# Patient Record
Sex: MALE | Race: WHITE | NOT HISPANIC OR LATINO | Employment: FULL TIME | URBAN - METROPOLITAN AREA
[De-identification: names, ages, dates, MRNs, and addresses within clinical notes are randomized per-mention and may not be internally consistent; named-entity substitution may affect disease eponyms.]

---

## 2017-04-21 ENCOUNTER — OFFICE VISIT (OUTPATIENT)
Dept: INTERNAL MEDICINE | Facility: CLINIC | Age: 32
End: 2017-04-21

## 2017-04-21 VITALS
TEMPERATURE: 97.4 F | BODY MASS INDEX: 37.79 KG/M2 | WEIGHT: 240.8 LBS | SYSTOLIC BLOOD PRESSURE: 122 MMHG | DIASTOLIC BLOOD PRESSURE: 76 MMHG | HEIGHT: 67 IN

## 2017-04-21 DIAGNOSIS — F41.8 DEPRESSION WITH ANXIETY: Primary | ICD-10-CM

## 2017-04-21 PROCEDURE — 99204 OFFICE O/P NEW MOD 45 MIN: CPT | Performed by: FAMILY MEDICINE

## 2017-04-21 RX ORDER — DULOXETIN HYDROCHLORIDE 30 MG/1
CAPSULE, DELAYED RELEASE ORAL
Qty: 60 CAPSULE | Refills: 0 | Status: SHIPPED | OUTPATIENT
Start: 2017-04-21 | End: 2017-05-19

## 2017-04-21 NOTE — PATIENT INSTRUCTIONS
1. PSYCH- depression with anxiety- education today re serotonin and norepinephrine imbalances in the limbic system. Will do trial with cymbalta with helps both of these chemicals. Will start at 30mg for a week and then increase to 60mg.  2. RECHECK- 1mo recheck mood

## 2017-04-21 NOTE — PROGRESS NOTES
"Subjective   Lexa Conn is a 32 y.o. male.     History of Present Illness   New patient, here to establish.     PSYCH- mood and fatigue issues. Today pt reports he started a new job 3yr ago. Then 1yr ago he started having issues. Describes mood as anxious and sad. Has associated symptoms anhedonia, overwhelmed, fatigue, w/d, decreased motivation, crying, memory and concentration problems, feeling helpless and hopeless. No SI.     The following portions of the patient's history were reviewed and updated as appropriate: current medications, past family history, past medical history, past social history, past surgical history and problem list.    Review of Systems   Constitutional: Positive for fatigue.   HENT: Positive for hearing loss.    Cardiovascular: Negative for chest pain.   Gastrointestinal: Negative for abdominal distention and abdominal pain.   Musculoskeletal: Positive for arthralgias.   Skin: Negative for color change.   Neurological: Negative for tremors, speech difficulty and headaches.   Psychiatric/Behavioral: Negative for agitation and confusion. The patient is nervous/anxious.         Depression   All other systems reviewed and are negative.        Current Outpatient Prescriptions:   •  DULoxetine (CYMBALTA) 30 MG capsule, 1 tab po qd x1week then 2 tabs po qd, Disp: 60 capsule, Rfl: 0    Objective     /76  Temp 97.4 °F (36.3 °C)  Ht 67\" (170.2 cm)  Wt 240 lb 12.8 oz (109 kg)  BMI 37.71 kg/m2    Physical Exam   Constitutional: He is oriented to person, place, and time. He appears well-developed and well-nourished.   HENT:   Right Ear: Tympanic membrane and ear canal normal.   Left Ear: Tympanic membrane and ear canal normal.   Mouth/Throat: Oropharynx is clear and moist.   Eyes: Conjunctivae and EOM are normal. Pupils are equal, round, and reactive to light.   Neck: No thyromegaly present.   Cardiovascular: Normal rate and regular rhythm.    Pulmonary/Chest: Effort normal and breath " sounds normal.   Neurological: He is alert and oriented to person, place, and time.   Skin: Skin is warm and dry.   Psychiatric: His behavior is normal. Judgment and thought content normal.   tearful   Vitals reviewed.      Assessment/Plan   Lexa was seen today for establish care.    Diagnoses and all orders for this visit:    Depression with anxiety  -     DULoxetine (CYMBALTA) 30 MG capsule; 1 tab po qd x1week then 2 tabs po qd      1. PSYCH- depression with anxiety- education today re serotonin and norepinephrine imbalances in the limbic system. Will do trial with cymbalta with helps both of these chemicals. Will start at 30mg for a week and then increase to 60mg.  2. RECHECK- 1mo recheck mood

## 2017-05-19 ENCOUNTER — OFFICE VISIT (OUTPATIENT)
Dept: INTERNAL MEDICINE | Facility: CLINIC | Age: 32
End: 2017-05-19

## 2017-05-19 VITALS
SYSTOLIC BLOOD PRESSURE: 122 MMHG | DIASTOLIC BLOOD PRESSURE: 86 MMHG | TEMPERATURE: 97.3 F | WEIGHT: 238.2 LBS | BODY MASS INDEX: 37.39 KG/M2 | HEIGHT: 67 IN

## 2017-05-19 DIAGNOSIS — F41.8 DEPRESSION WITH ANXIETY: Primary | ICD-10-CM

## 2017-05-19 PROCEDURE — 99213 OFFICE O/P EST LOW 20 MIN: CPT | Performed by: FAMILY MEDICINE

## 2017-05-19 RX ORDER — VENLAFAXINE HYDROCHLORIDE 75 MG/1
CAPSULE, EXTENDED RELEASE ORAL
Qty: 30 CAPSULE | Refills: 1 | Status: SHIPPED | OUTPATIENT
Start: 2017-05-19 | End: 2017-07-03 | Stop reason: SDUPTHER

## 2017-05-19 RX ORDER — VENLAFAXINE HYDROCHLORIDE 75 MG/1
CAPSULE, EXTENDED RELEASE ORAL
Qty: 30 CAPSULE | Refills: 0 | Status: SHIPPED | OUTPATIENT
Start: 2017-05-19 | End: 2017-05-19 | Stop reason: SDUPTHER

## 2017-07-03 ENCOUNTER — OFFICE VISIT (OUTPATIENT)
Dept: INTERNAL MEDICINE | Facility: CLINIC | Age: 32
End: 2017-07-03

## 2017-07-03 VITALS
TEMPERATURE: 97.4 F | SYSTOLIC BLOOD PRESSURE: 118 MMHG | WEIGHT: 240 LBS | DIASTOLIC BLOOD PRESSURE: 84 MMHG | BODY MASS INDEX: 37.67 KG/M2 | HEIGHT: 67 IN

## 2017-07-03 DIAGNOSIS — F41.8 DEPRESSION WITH ANXIETY: ICD-10-CM

## 2017-07-03 DIAGNOSIS — Z00.00 ANNUAL PHYSICAL EXAM: Primary | ICD-10-CM

## 2017-07-03 LAB
25(OH)D3 SERPL-MCNC: 9.7 NG/ML
ALBUMIN SERPL-MCNC: 5 G/DL (ref 3.2–4.8)
ALBUMIN/GLOB SERPL: 2.1 G/DL (ref 1.5–2.5)
ALP SERPL-CCNC: 88 U/L (ref 25–100)
ALT SERPL W P-5'-P-CCNC: 37 U/L (ref 7–40)
ANION GAP SERPL CALCULATED.3IONS-SCNC: 12 MMOL/L (ref 3–11)
AST SERPL-CCNC: 26 U/L (ref 0–33)
BASOPHILS # BLD AUTO: 0.03 10*3/MM3 (ref 0–0.2)
BASOPHILS NFR BLD AUTO: 0.3 % (ref 0–1)
BILIRUB SERPL-MCNC: 0.6 MG/DL (ref 0.3–1.2)
BUN BLD-MCNC: 16 MG/DL (ref 9–23)
BUN/CREAT SERPL: 17.8 (ref 7–25)
CALCIUM SPEC-SCNC: 11 MG/DL (ref 8.7–10.4)
CHLORIDE SERPL-SCNC: 101 MMOL/L (ref 99–109)
CO2 SERPL-SCNC: 26 MMOL/L (ref 20–31)
CREAT BLD-MCNC: 0.9 MG/DL (ref 0.6–1.3)
DEPRECATED RDW RBC AUTO: 46.2 FL (ref 37–54)
EOSINOPHIL # BLD AUTO: 0.18 10*3/MM3 (ref 0–0.3)
EOSINOPHIL NFR BLD AUTO: 1.7 % (ref 0–3)
ERYTHROCYTE [DISTWIDTH] IN BLOOD BY AUTOMATED COUNT: 13.8 % (ref 11.3–14.5)
GFR SERPL CREATININE-BSD FRML MDRD: 98 ML/MIN/1.73
GLOBULIN UR ELPH-MCNC: 2.4 GM/DL
GLUCOSE BLD-MCNC: 67 MG/DL (ref 70–100)
HCT VFR BLD AUTO: 50.3 % (ref 38.9–50.9)
HGB BLD-MCNC: 17.1 G/DL (ref 13.1–17.5)
IMM GRANULOCYTES # BLD: 0.11 10*3/MM3 (ref 0–0.03)
IMM GRANULOCYTES NFR BLD: 1.1 % (ref 0–0.6)
LYMPHOCYTES # BLD AUTO: 2.81 10*3/MM3 (ref 0.6–4.8)
LYMPHOCYTES NFR BLD AUTO: 27.1 % (ref 24–44)
MCH RBC QN AUTO: 31.3 PG (ref 27–31)
MCHC RBC AUTO-ENTMCNC: 34 G/DL (ref 32–36)
MCV RBC AUTO: 92.1 FL (ref 80–99)
MONOCYTES # BLD AUTO: 0.85 10*3/MM3 (ref 0–1)
MONOCYTES NFR BLD AUTO: 8.2 % (ref 0–12)
NEUTROPHILS # BLD AUTO: 6.38 10*3/MM3 (ref 1.5–8.3)
NEUTROPHILS NFR BLD AUTO: 61.6 % (ref 41–71)
PLATELET # BLD AUTO: 278 10*3/MM3 (ref 150–450)
PMV BLD AUTO: 10.1 FL (ref 6–12)
POTASSIUM BLD-SCNC: 4.3 MMOL/L (ref 3.5–5.5)
PROT SERPL-MCNC: 7.4 G/DL (ref 5.7–8.2)
RBC # BLD AUTO: 5.46 10*6/MM3 (ref 4.2–5.76)
SODIUM BLD-SCNC: 139 MMOL/L (ref 132–146)
TSH SERPL DL<=0.05 MIU/L-ACNC: 0.98 MIU/ML (ref 0.35–5.35)
VIT B12 BLD-MCNC: 542 PG/ML (ref 211–911)
WBC NRBC COR # BLD: 10.36 10*3/MM3 (ref 3.5–10.8)

## 2017-07-03 PROCEDURE — 84443 ASSAY THYROID STIM HORMONE: CPT | Performed by: FAMILY MEDICINE

## 2017-07-03 PROCEDURE — 90715 TDAP VACCINE 7 YRS/> IM: CPT | Performed by: FAMILY MEDICINE

## 2017-07-03 PROCEDURE — 99395 PREV VISIT EST AGE 18-39: CPT | Performed by: FAMILY MEDICINE

## 2017-07-03 PROCEDURE — 82607 VITAMIN B-12: CPT | Performed by: FAMILY MEDICINE

## 2017-07-03 PROCEDURE — 80053 COMPREHEN METABOLIC PANEL: CPT | Performed by: FAMILY MEDICINE

## 2017-07-03 PROCEDURE — 90471 IMMUNIZATION ADMIN: CPT | Performed by: FAMILY MEDICINE

## 2017-07-03 PROCEDURE — 82306 VITAMIN D 25 HYDROXY: CPT | Performed by: FAMILY MEDICINE

## 2017-07-03 PROCEDURE — 85025 COMPLETE CBC W/AUTO DIFF WBC: CPT | Performed by: FAMILY MEDICINE

## 2017-07-03 RX ORDER — VENLAFAXINE HYDROCHLORIDE 75 MG/1
CAPSULE, EXTENDED RELEASE ORAL
Qty: 30 CAPSULE | Refills: 0 | Status: SHIPPED | OUTPATIENT
Start: 2017-07-03 | End: 2017-08-24 | Stop reason: SDUPTHER

## 2017-07-03 NOTE — PATIENT INSTRUCTIONS
1. CPE- will do labs today, especially as he is still tired. Will do Tdap today. Cerumen. Will irrigate both ears today.  2. PSYCH- depression with anxiety- mood where it should be for 2mo of treatment. Will continue the effexor and recheck in 1mo to ensure he reaches goal.   3. RECHECK- 1mo

## 2017-07-03 NOTE — PROGRESS NOTES
Subjective   Lexa Conn is a 32 y.o. male.   History of Present Illness   Here for 1mo recheck mood and CPE. Last seen 5/19/17. Pt was seen 4/21/17 as a new patient.      1.PSYCH- depression with anxiety, diagnosed at initial visit 4/21/17. At that time pt reported 1yr of symptoms of sad and anxious mood with anhedonia, feeling overwhelmed, fatigue, w/d, decreased motivation, crying, memory and concentration problems, feeling helpless and hopeless. No SI. Pt was started on Cymbalta and felt a little better but only reached 30% at 1mo and was still very tired. Was changed to Effexor 75. Today he reports no issues with the transition and no S/E with effexor. Is feeling better. Overall is 60% better. Is still tired.    2. 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  CV- risk factors: not a smoker. No fam hx premature CAD  GI- no rectal bleeding. No fam hx colon ca. Previous colonoscopy: none  - no prostate or bladder c/o. No ED. No fam hx prostate ca.  Last labs: nothing recent.  Immunizations: Last TDaP: due. Previous pneumovax: none. Previous zostavax: none.  New concerns: right ear pain. Has had issues with ear wax in past, especially on right. Has been hurting and he is concerned re this.     The following portions of the patient's history were reviewed and updated as appropriate: current medications, past family history, past medical history, past social history, past surgical history and problem list.    Review of Systems   HENT: Positive for ear pain (right ear).    Cardiovascular: Negative for chest pain.   Gastrointestinal: Negative for abdominal distention and abdominal pain.   Skin: Negative for color change.   Neurological: Negative for tremors, speech difficulty and headaches.   Psychiatric/Behavioral: Negative for agitation and confusion.   All other systems reviewed and are negative.        Current Outpatient Prescriptions:   •  venlafaxine XR (EFFEXOR-XR) 75 MG 24 hr capsule, 1 tab po qd, Disp: 30 capsule,  "Rfl: 0      Objective     /84  Temp 97.4 °F (36.3 °C)  Ht 67\" (170.2 cm)  Wt 240 lb (109 kg)  BMI 37.59 kg/m2    Physical Exam   Constitutional: He is oriented to person, place, and time. He appears well-developed and well-nourished.   HENT:   Right Ear: Tympanic membrane and ear canal normal.   Left Ear: Tympanic membrane and ear canal normal.   Mouth/Throat: Oropharynx is clear and moist.   TM blocked bilat   Eyes: Conjunctivae and EOM are normal. Pupils are equal, round, and reactive to light.   Neck: No thyromegaly present.   Cardiovascular: Normal rate and regular rhythm.    Pulmonary/Chest: Effort normal and breath sounds normal.   Neurological: He is alert and oriented to person, place, and time.   Skin: Skin is warm and dry.   Psychiatric: He has a normal mood and affect.   Vitals reviewed.      Reviewed the following with the patient: Discussion today with patient regarding current guidelines for timing/ frequency of colonoscopy and lab tests (including PSA).     Immunizations discussed today with current recommendations advised for DTaP, Zostavax, Pneumovax and Prevnar 13.    Appropriate diet and stretching discussed with handouts provided.        Assessment/Plan   eLxa was seen today for annual exam.    Diagnoses and all orders for this visit:    Annual physical exam  -     CBC & Differential  -     Comprehensive Metabolic Panel  -     Vitamin B12  -     Vitamin D 25 Hydroxy  -     TSH  -     Tdap Vaccine Greater Than or Equal To 8yo IM  -     CBC Auto Differential    Depression with anxiety  -     venlafaxine XR (EFFEXOR-XR) 75 MG 24 hr capsule; 1 tab po qd        1. CPE- will do labs today, especially as he is still tired. Will do Tdap today. Cerumen. Will irrigate both ears today.  2. PSYCH- depression with anxiety- mood where it should be for 2mo of treatment. Will continue the effexor and recheck in 1mo to ensure he reaches goal.   3. RECHECK- 1mo       "

## 2017-07-07 ENCOUNTER — TELEPHONE (OUTPATIENT)
Dept: INTERNAL MEDICINE | Facility: CLINIC | Age: 32
End: 2017-07-07

## 2017-07-07 ENCOUNTER — OFFICE VISIT (OUTPATIENT)
Dept: INTERNAL MEDICINE | Facility: CLINIC | Age: 32
End: 2017-07-07

## 2017-07-07 VITALS — WEIGHT: 240 LBS | TEMPERATURE: 97.8 F | BODY MASS INDEX: 37.67 KG/M2 | HEIGHT: 67 IN

## 2017-07-07 DIAGNOSIS — J30.9 ALLERGIC RHINITIS, UNSPECIFIED ALLERGIC RHINITIS TRIGGER, UNSPECIFIED RHINITIS SEASONALITY: Primary | ICD-10-CM

## 2017-07-07 PROCEDURE — 99213 OFFICE O/P EST LOW 20 MIN: CPT | Performed by: FAMILY MEDICINE

## 2017-07-07 RX ORDER — FLUTICASONE PROPIONATE 50 MCG
2 SPRAY, SUSPENSION (ML) NASAL DAILY
Qty: 1 EACH | Refills: 0 | Status: SHIPPED | OUTPATIENT
Start: 2017-07-07 | End: 2017-08-06

## 2017-07-07 RX ORDER — HYDROCODONE BITARTRATE AND ACETAMINOPHEN 5; 325 MG/1; MG/1
1 TABLET ORAL EVERY 6 HOURS PRN
Qty: 8 TABLET | Refills: 0 | Status: SHIPPED | OUTPATIENT
Start: 2017-07-07 | End: 2017-08-24

## 2017-07-07 NOTE — PROGRESS NOTES
"Subjective   Lexa Conn is a 32 y.o. male.     History of Present Illness   Here today as a work in for right ear pain. Was seen yesterday for CPE and had cerumen impaction, irrigated to clear with good success.    ENT- today pt reports he had minor vertigo with the irrigation. No pain with the procedure but it did not relieve the pain that he already had and it is still present. Pain kept him awake last night.    The following portions of the patient's history were reviewed and updated as appropriate: current medications, past family history, past medical history, past social history, past surgical history and problem list.    Review of Systems   HENT: Positive for ear pain (on right).    Cardiovascular: Negative for chest pain.   Gastrointestinal: Negative for abdominal distention and abdominal pain.   Skin: Negative for color change.   Neurological: Negative for tremors, speech difficulty and headaches.   Psychiatric/Behavioral: Negative for agitation and confusion.   All other systems reviewed and are negative.        Current Outpatient Prescriptions:   •  fluticasone (FLONASE) 50 MCG/ACT nasal spray, 2 sprays into each nostril Daily for 30 days., Disp: 1 each, Rfl: 0  •  HYDROcodone-acetaminophen (NORCO) 5-325 MG per tablet, Take 1 tablet by mouth Every 6 (Six) Hours As Needed for Severe Pain (7-10)., Disp: 8 tablet, Rfl: 0  •  venlafaxine XR (EFFEXOR-XR) 75 MG 24 hr capsule, 1 tab po qd, Disp: 30 capsule, Rfl: 0    Objective     Temp 97.8 °F (36.6 °C)  Ht 67\" (170.2 cm)  Wt 240 lb (109 kg)  BMI 37.59 kg/m2    Physical Exam   Constitutional: He is oriented to person, place, and time. He appears well-developed and well-nourished.   HENT:   Right Ear: Tympanic membrane and ear canal normal.   Left Ear: Tympanic membrane and ear canal normal.   Mouth/Throat: Oropharynx is clear and moist.   TM loss of normal light reflex bilat. Canal normal. No TMJ dysfunction. Throat clear.   Eyes: Conjunctivae and EOM are " normal. Pupils are equal, round, and reactive to light.   Neck: No thyromegaly present.   Cardiovascular: Normal rate and regular rhythm.    Pulmonary/Chest: Effort normal and breath sounds normal.   Neurological: He is alert and oriented to person, place, and time.   Skin: Skin is warm and dry.   Psychiatric: He has a normal mood and affect.   Vitals reviewed.      Assessment/Plan   Lexa was seen today for earache.    Diagnoses and all orders for this visit:    Allergic rhinitis, unspecified allergic rhinitis trigger, unspecified rhinitis seasonality  -     fluticasone (FLONASE) 50 MCG/ACT nasal spray; 2 sprays into each nostril Daily for 30 days.  -     HYDROcodone-acetaminophen (NORCO) 5-325 MG per tablet; Take 1 tablet by mouth Every 6 (Six) Hours As Needed for Severe Pain (7-10).      1. RESP- allergies with eustachian tube dysfunction. Edu re the pathophysiology provided. Will treat with flonase. Continue the claritin.   2. RECHECK- prn

## 2017-07-07 NOTE — TELEPHONE ENCOUNTER
PT SAID HE CAME IN THIS PAST Monday AND HIS RIGHT EAR STILL HURTS AND WANTS TO NO IF HE COULD COME BACK IN THIS AFTER NOON TO GET IT LOOKED AT. PT WANTS YOU TO CALL HIM

## 2017-07-07 NOTE — PATIENT INSTRUCTIONS
1. RESP- allergies with eustachian tube dysfunction. Edu re the pathophysiology provided. Will treat with flonase. Continue the claritin.   2. RECHECK- prn

## 2017-08-24 ENCOUNTER — OFFICE VISIT (OUTPATIENT)
Dept: INTERNAL MEDICINE | Facility: CLINIC | Age: 32
End: 2017-08-24

## 2017-08-24 VITALS
DIASTOLIC BLOOD PRESSURE: 104 MMHG | BODY MASS INDEX: 38.04 KG/M2 | HEIGHT: 67 IN | TEMPERATURE: 97.5 F | SYSTOLIC BLOOD PRESSURE: 144 MMHG | WEIGHT: 242.4 LBS

## 2017-08-24 DIAGNOSIS — F41.8 DEPRESSION WITH ANXIETY: Primary | ICD-10-CM

## 2017-08-24 DIAGNOSIS — E83.52 HYPERCALCEMIA: ICD-10-CM

## 2017-08-24 DIAGNOSIS — E55.9 VITAMIN D DEFICIENCY: ICD-10-CM

## 2017-08-24 DIAGNOSIS — IMO0001 ELEVATED BLOOD PRESSURE: ICD-10-CM

## 2017-08-24 DIAGNOSIS — E16.2 HYPOGLYCEMIA: ICD-10-CM

## 2017-08-24 PROCEDURE — 99214 OFFICE O/P EST MOD 30 MIN: CPT | Performed by: FAMILY MEDICINE

## 2017-08-24 RX ORDER — VENLAFAXINE HYDROCHLORIDE 75 MG/1
CAPSULE, EXTENDED RELEASE ORAL
Qty: 30 CAPSULE | Refills: 5 | Status: SHIPPED | OUTPATIENT
Start: 2017-08-24 | End: 2018-04-27 | Stop reason: SDUPTHER

## 2017-08-24 NOTE — PATIENT INSTRUCTIONS
1. PSYCH- depression with anxiety- mood at goal RF effexor x6mo now.  2. CV- BP elevated today. Discussed that this could relate to the effexor, could be an isolated incident or he could be developing HTN. He is to do every morning BP checks and bring log to visit in 1mo.  3. ABN LABS- low sugar, high calcium, low vit D- discussed that the low sugar can indicate he needs more protein in his diet. Can also be related to labile sugars. Will recheck his glu and A1C in 1mo. Discussed that the high calcium and low vit D can be related. He is to start vitamin D 5000 IU every day now. Will recheck his calcium, vit D and a PTH in 1mo.  4. RECHECK- 1mo

## 2017-08-24 NOTE — PROGRESS NOTES
Subjective   Lexa Conn is a 32 y.o. male.     History of Present Illness   Here for 1mo recheck mood. Last seen 7/7/17 for allergies. Was seen 7/3/17 for recheck and CPE. Pt was seen 4/21/17 as a new patient. Lab 7/3/17 demo normal CBC, TSH and B12. Had CMP with glu 67, calcium 11. Had vit D 9.7.     1.PSYCH- depression with anxiety, diagnosed at initial visit 4/21/17. At that time pt reported 1yr of symptoms of sad and anxious mood with anhedonia, feeling overwhelmed, fatigue, w/d, decreased motivation, crying, memory and concentration problems, feeling helpless and hopeless. No SI. Pt was started on Cymbalta and felt a little better but only reached 30% at 1mo and was still very tired. Was changed to Effexor 75 at reached 60% of goal at 2mo; was continued on same. Today he reports he feels at goal on the effexor.     2. RESP- allergies- given Flonase.  3. ABN LABS- glu 67, calcium 11, vit D 9.7.  Today pt reports he was fasting that day.  He is still tired but not bad and feels it relates to work.  He does get protein in his diet. He does not take TUMS or calcium. He has not started the vit D yet. Has been taking a multivite with D.     The following portions of the patient's history were reviewed and updated as appropriate: current medications, past family history, past medical history, past social history, past surgical history and problem list.    Review of Systems   Cardiovascular: Negative for chest pain.   Gastrointestinal: Negative for abdominal distention and abdominal pain.   Skin: Negative for color change.   Neurological: Negative for tremors, speech difficulty and headaches.   Psychiatric/Behavioral: Negative for agitation and confusion.   All other systems reviewed and are negative.        Current Outpatient Prescriptions:   •  venlafaxine XR (EFFEXOR-XR) 75 MG 24 hr capsule, 1 tab po qd, Disp: 30 capsule, Rfl: 5    Objective     BP (!) 144/104 (BP Location: Left arm, Patient Position: Sitting)   "Temp 97.5 °F (36.4 °C)  Ht 67\" (170.2 cm)  Wt 242 lb 6.4 oz (110 kg)  BMI 37.97 kg/m2    Physical Exam   Constitutional: He is oriented to person, place, and time. He appears well-developed and well-nourished.   HENT:   Right Ear: Tympanic membrane and ear canal normal.   Left Ear: Tympanic membrane and ear canal normal.   Mouth/Throat: Oropharynx is clear and moist.   Eyes: Conjunctivae and EOM are normal. Pupils are equal, round, and reactive to light.   Neck: No thyromegaly present.   Cardiovascular: Normal rate and regular rhythm.    Pulmonary/Chest: Effort normal and breath sounds normal.   Neurological: He is alert and oriented to person, place, and time.   Skin: Skin is warm and dry.   Psychiatric: He has a normal mood and affect.   Vitals reviewed.      Assessment/Plan   Lexa was seen today for follow-up.    Diagnoses and all orders for this visit:    Depression with anxiety  -     venlafaxine XR (EFFEXOR-XR) 75 MG 24 hr capsule; 1 tab po qd    Hypoglycemia    Hypercalcemia    Vitamin D deficiency    Elevated blood pressure      1. PSYCH- depression with anxiety- mood at goal RF effexor x6mo now.  2. CV- BP elevated today. Discussed that this could relate to the effexor, could be an isolated incident or he could be developing HTN. He is to do every morning BP checks and bring log to visit in 1mo.  3. ABN LABS- low sugar, high calcium, low vit D- discussed that the low sugar can indicate he needs more protein in his diet. Can also be related to labile sugars. Will recheck his glu and A1C in 1mo. Discussed that the high calcium and low vit D can be related. He is to start vitamin D 5000 IU every day now. Will recheck his calcium, vit D and a PTH in 1mo.  4. RECHECK- 1mo       "

## 2017-09-22 ENCOUNTER — OFFICE VISIT (OUTPATIENT)
Dept: INTERNAL MEDICINE | Facility: CLINIC | Age: 32
End: 2017-09-22

## 2017-09-22 VITALS
BODY MASS INDEX: 38.39 KG/M2 | WEIGHT: 244.6 LBS | SYSTOLIC BLOOD PRESSURE: 140 MMHG | HEIGHT: 67 IN | TEMPERATURE: 98.1 F | DIASTOLIC BLOOD PRESSURE: 90 MMHG

## 2017-09-22 DIAGNOSIS — R89.9 ABNORMAL LABORATORY TEST: Primary | ICD-10-CM

## 2017-09-22 DIAGNOSIS — R89.9 ABNORMAL LABORATORY TEST RESULT: Primary | ICD-10-CM

## 2017-09-22 DIAGNOSIS — I10 ESSENTIAL HYPERTENSION: ICD-10-CM

## 2017-09-22 LAB
25(OH)D3 SERPL-MCNC: 26.8 NG/ML
CALCIUM SPEC-SCNC: 9.8 MG/DL (ref 8.7–10.4)

## 2017-09-22 PROCEDURE — 82310 ASSAY OF CALCIUM: CPT | Performed by: FAMILY MEDICINE

## 2017-09-22 PROCEDURE — 83970 ASSAY OF PARATHORMONE: CPT | Performed by: FAMILY MEDICINE

## 2017-09-22 PROCEDURE — 82306 VITAMIN D 25 HYDROXY: CPT | Performed by: FAMILY MEDICINE

## 2017-09-22 PROCEDURE — 99214 OFFICE O/P EST MOD 30 MIN: CPT | Performed by: FAMILY MEDICINE

## 2017-09-22 RX ORDER — TOPIRAMATE 25 MG/1
TABLET ORAL
Qty: 60 TABLET | Refills: 0 | Status: SHIPPED | OUTPATIENT
Start: 2017-09-22 | End: 2017-10-26

## 2017-09-22 NOTE — PROGRESS NOTES
"Subjective   Lexa Conn is a 32 y.o. male.     History of Present Illness   Here for 1mo recheck BP. Last seen 8/24/17 for recheck mood. Was seen 7/3/17 for CPE. Pt was seen 4/21/17 as a new patient. Lab 7/3/17 demo normal CBC, TSH and B12. Had vit D 9.7; advised 5k. Had CMP with glu 67, calcium 11 with recheck pending.     1.PSYCH- depression with anxiety, diagnosed at initial visit 4/21/17. At that time pt reported 1yr of symptoms of sad and anxious mood with anhedonia, feeling overwhelmed, fatigue, w/d, decreased motivation, crying, memory and concentration problems, feeling helpless and hopeless. No SI. Pt was started on Cymbalta and felt a little better but only reached 30% at 1mo and was still very tired. Was changed to Effexor 75 and reached goal by visit 8/24/17.  2. RESP- allergies- given Flonase.  3. ABN LABS- glu 67, calcium 11, vit D 9.7.  No symptoms.    4. CV-  Elevated BP at last visit. Pt asked to do home checks. Brings log today that demo 131-143 over 88-97. He does have a fam hx HTN. Has gained 40 lb at his current job.  The following portions of the patient's history were reviewed and updated as appropriate: current medications, past family history, past medical history, past social history, past surgical history and problem list.    Review of Systems   Cardiovascular: Negative for chest pain.   Gastrointestinal: Negative for abdominal distention and abdominal pain.   Skin: Negative for color change.   Neurological: Negative for tremors, speech difficulty and headaches.   Psychiatric/Behavioral: Negative for agitation and confusion.   All other systems reviewed and are negative.        Current Outpatient Prescriptions:   •  topiramate (TOPAMAX) 25 MG tablet, 1 tab po qd x1wk then 2 tabs po qd, Disp: 60 tablet, Rfl: 0  •  venlafaxine XR (EFFEXOR-XR) 75 MG 24 hr capsule, 1 tab po qd, Disp: 30 capsule, Rfl: 5    Objective     /90  Temp 98.1 °F (36.7 °C)  Ht 67\" (170.2 cm)  Wt 244 lb 9.6 " oz (111 kg)  BMI 38.31 kg/m2    Physical Exam   Constitutional: He is oriented to person, place, and time. He appears well-developed and well-nourished.   HENT:   Right Ear: Tympanic membrane and ear canal normal.   Left Ear: Tympanic membrane and ear canal normal.   Mouth/Throat: Oropharynx is clear and moist.   Eyes: Conjunctivae and EOM are normal. Pupils are equal, round, and reactive to light.   Neck: No thyromegaly present.   Cardiovascular: Normal rate and regular rhythm.    Pulmonary/Chest: Effort normal and breath sounds normal.   Neurological: He is alert and oriented to person, place, and time.   Skin: Skin is warm and dry.   Psychiatric: He has a normal mood and affect.   Vitals reviewed.      Assessment/Plan   Lexa was seen today for follow-up.    Diagnoses and all orders for this visit:    Abnormal laboratory test    Essential hypertension  -     topiramate (TOPAMAX) 25 MG tablet; 1 tab po qd x1wk then 2 tabs po qd      1. ABN LAB- recheck sugar 66 with A1C 5.0. Discussed with pt that he needs more protein in his diet. He has a protein shake in am and is advised to have another. CMP drawn today.  2. CV- HTN- discussed that he has mild hypertension. Discussed weight loss vs BP meds. Will do trial with low dose topamax first. If he has side effects that he cannot tolerate, he is to call and we will change him to lisinopril HCT 10/12.5.  3. RECHECK- 1mo

## 2017-09-22 NOTE — PATIENT INSTRUCTIONS
1. ABN LAB- recheck sugar 66 with A1C 5.0. Discussed with pt that he needs more protein in his diet. He has a protein shake in am and is advised to have another. CMP drawn today.  2. CV- HTN- discussed that he has mild hypertension. Discussed weight loss vs BP meds. Will do trial with low dose topamax first. If he has side effects that he cannot tolerate, he is to call and we will change him to lisinopril HCT 10/12.5.  3. RECHECK- 1mo

## 2017-09-25 LAB — PTH-INTACT SERPL-MCNC: 25 PG/ML (ref 15–65)

## 2017-10-04 ENCOUNTER — TELEPHONE (OUTPATIENT)
Dept: INTERNAL MEDICINE | Facility: CLINIC | Age: 32
End: 2017-10-04

## 2017-10-04 NOTE — TELEPHONE ENCOUNTER
Patient is calling requesting a phone call about his medication. Patient feels like the meds are not working. Please call patient.

## 2017-10-05 NOTE — TELEPHONE ENCOUNTER
I spoke with pt who says that he doesn't think the medication is working for his weight loss. I explained that the topamax is generally what we start with but it's the Trokendi that seems to produce the best results. I told him that we have samples that I will get ready for him but pt states that he is in [place] but will have his girlfriend come by tomorrow to pick them up. I told pt I will consult with Dr. Mitchell tomorrow to be certain of the dose and let him know when this is ready. Pt expressed understanding.

## 2017-10-06 NOTE — TELEPHONE ENCOUNTER
Per Dr. Mitchell, the patient just needs to increase his dose to 100 vs 50. To avoid an additional co-pay, pt will come to the office and  samples of Trokendi 100 mg. Pt informed and will have his girlfriend come over and pick these up as they are ready in the front office.

## 2017-10-13 ENCOUNTER — TELEPHONE (OUTPATIENT)
Dept: INTERNAL MEDICINE | Facility: CLINIC | Age: 32
End: 2017-10-13

## 2017-10-26 ENCOUNTER — OFFICE VISIT (OUTPATIENT)
Dept: INTERNAL MEDICINE | Facility: CLINIC | Age: 32
End: 2017-10-26

## 2017-10-26 VITALS
TEMPERATURE: 97 F | WEIGHT: 243 LBS | BODY MASS INDEX: 38.14 KG/M2 | DIASTOLIC BLOOD PRESSURE: 98 MMHG | SYSTOLIC BLOOD PRESSURE: 140 MMHG | HEIGHT: 67 IN

## 2017-10-26 DIAGNOSIS — J06.9 ACUTE URI: ICD-10-CM

## 2017-10-26 DIAGNOSIS — I10 ESSENTIAL HYPERTENSION: Primary | ICD-10-CM

## 2017-10-26 PROCEDURE — 99214 OFFICE O/P EST MOD 30 MIN: CPT | Performed by: FAMILY MEDICINE

## 2017-10-26 RX ORDER — LISINOPRIL AND HYDROCHLOROTHIAZIDE 12.5; 1 MG/1; MG/1
1 TABLET ORAL DAILY
Qty: 30 TABLET | Refills: 0 | Status: SHIPPED | OUTPATIENT
Start: 2017-10-26 | End: 2017-11-29 | Stop reason: DRUGHIGH

## 2017-10-26 NOTE — PATIENT INSTRUCTIONS
1. CV- HTN- will stop the topamax as not effective enough. Will start lisinopril HCT now. Pt will do home BP checks and send them to me in 1mo. Will do RF if doing well.  2. RESP- URI- discussed that his current symptoms are either viral or allergy. To continue the flonase. If the phlegm turns discolored yellow or green, to start an antibiotic. Is given printed Rx for clarithromycin now. Reminded to avoid decongestants but can take 50mg of zinc daily with food for the virus.  3. RECHECK- 6mo routine

## 2017-10-26 NOTE — PROGRESS NOTES
Subjective   Lexa Conn is a 32 y.o. male.     History of Present Illness   Here for 1mo recheck BP. Last seen 9/22/17 for  recheck BP. Was seen 8/24/17 for recheck mood. Was seen 7/3/17 for CPE. Pt was seen 4/21/17 as a new patient. Lab 7/3/17 demo normal CBC, TSH and B12. Had vit D 9.7; advised 5k. Had CMP with glu 67, calcium 11 with recheck pending.     1.PSYCH- depression with anxiety, diagnosed at initial visit 4/21/17. At that time pt reported 1yr of symptoms of sad and anxious mood with anhedonia, feeling overwhelmed, fatigue, w/d, decreased motivation, crying, memory and concentration problems, feeling helpless and hopeless. No SI. Pt was started on Cymbalta and felt a little better but only reached 30% at 1mo and was still very tired. Was changed to Effexor 75 and reached goal by visit 8/24/17.  2. RESP- allergies- given Flonase. Today pt reports he got sick 2wk ago with a cold. Is still having a ST. Phlegm is clear again.    3. ABN LABS- glu 67, calcium 11, vit D 9.7.  No symptoms.     4. CV-  Elevated -143 over 88-97. Discussion re BP treatment vs weight loss as he had recently gained 40 lb. Was started on Topamax, called to change to trokendi 100. Discussed that if he did not respond to this that we would use Lisinopril HCT. Today he reports he did not respond to topamax or trokendi. He has lost 1.5 lb but not more and his BP is still elevated.     The following portions of the patient's history were reviewed and updated as appropriate: current medications, past family history, past medical history, past social history, past surgical history and problem list.    Review of Systems   Cardiovascular: Negative for chest pain.   Gastrointestinal: Negative for abdominal distention and abdominal pain.   Skin: Negative for color change.   Neurological: Negative for tremors, speech difficulty and headaches.   Psychiatric/Behavioral: Negative for agitation and confusion.   All other systems reviewed and  "are negative.        Current Outpatient Prescriptions:   •  clarithromycin XL (BIAXIN XL) 500 MG 24 hr tablet, Take 2 tablets by mouth Daily., Disp: 14 tablet, Rfl: 0  •  lisinopril-hydrochlorothiazide (ZESTORETIC) 10-12.5 MG per tablet, Take 1 tablet by mouth Daily., Disp: 30 tablet, Rfl: 0  •  venlafaxine XR (EFFEXOR-XR) 75 MG 24 hr capsule, 1 tab po qd, Disp: 30 capsule, Rfl: 5    Objective     /98  Temp 97 °F (36.1 °C)  Ht 67\" (170.2 cm)  Wt 243 lb (110 kg)  BMI 38.06 kg/m2    Physical Exam   Constitutional: He is oriented to person, place, and time. He appears well-developed and well-nourished.   HENT:   Right Ear: Tympanic membrane and ear canal normal.   Left Ear: Tympanic membrane and ear canal normal.   Mouth/Throat: Oropharynx is clear and moist.   Eyes: Conjunctivae and EOM are normal. Pupils are equal, round, and reactive to light.   Neck: No thyromegaly present.   Cardiovascular: Normal rate and regular rhythm.    Pulmonary/Chest: Effort normal and breath sounds normal.   Neurological: He is alert and oriented to person, place, and time.   Skin: Skin is warm and dry.   Psychiatric: He has a normal mood and affect.   Vitals reviewed.      Assessment/Plan   Lexa was seen today for follow-up.    Diagnoses and all orders for this visit:    Essential hypertension  -     lisinopril-hydrochlorothiazide (ZESTORETIC) 10-12.5 MG per tablet; Take 1 tablet by mouth Daily.    Acute URI  -     clarithromycin XL (BIAXIN XL) 500 MG 24 hr tablet; Take 2 tablets by mouth Daily.      1. CV- HTN- will stop the topamax as not effective enough. Will start lisinopril HCT now. Pt will do home BP checks and send them to me in 1mo. Will do RF if doing well.  2. RESP- URI- discussed that his current symptoms are either viral or allergy. To continue the flonase. If the phlegm turns discolored yellow or green, to start an antibiotic. Is given printed Rx for clarithromycin now. Reminded to avoid decongestants but can " take 50mg of zinc daily with food for the virus.  3. RECHECK- 6mo routine

## 2017-11-29 ENCOUNTER — TELEPHONE (OUTPATIENT)
Dept: INTERNAL MEDICINE | Facility: CLINIC | Age: 32
End: 2017-11-29

## 2017-11-29 RX ORDER — LISINOPRIL AND HYDROCHLOROTHIAZIDE 20; 12.5 MG/1; MG/1
1 TABLET ORAL DAILY
Qty: 30 TABLET | Refills: 4 | Status: SHIPPED | OUTPATIENT
Start: 2017-11-29 | End: 2017-12-29

## 2017-11-29 NOTE — TELEPHONE ENCOUNTER
----- Message from Lexa Conn sent at 11/27/2017  9:17 PM EST -----  Regarding: Prescription Question  Contact: [phone]  Dr. Mitchell   I have been checking my BP over the last month and the average on this medication has been 140/98. I haven’t had any sideffects due to taking medication, I’m going to need a refill if you are wanting me to stay on it. Please let me know your thoughts.

## 2017-11-29 NOTE — TELEPHONE ENCOUNTER
Please tell pt that he needs a higher dose of the lisinopril HCT. Will increase it to 20/12.5, taken 1 qd. (please send in Rx after you tell pt for 5mo). Pt to keep the recheck appt in 5mo.ran

## 2018-04-27 ENCOUNTER — OFFICE VISIT (OUTPATIENT)
Dept: INTERNAL MEDICINE | Facility: CLINIC | Age: 33
End: 2018-04-27

## 2018-04-27 VITALS
WEIGHT: 253 LBS | HEIGHT: 67 IN | BODY MASS INDEX: 39.71 KG/M2 | DIASTOLIC BLOOD PRESSURE: 84 MMHG | SYSTOLIC BLOOD PRESSURE: 148 MMHG

## 2018-04-27 DIAGNOSIS — F41.8 DEPRESSION WITH ANXIETY: ICD-10-CM

## 2018-04-27 DIAGNOSIS — I10 ESSENTIAL HYPERTENSION: Primary | ICD-10-CM

## 2018-04-27 PROCEDURE — 99214 OFFICE O/P EST MOD 30 MIN: CPT | Performed by: FAMILY MEDICINE

## 2018-04-27 RX ORDER — VALSARTAN AND HYDROCHLOROTHIAZIDE 160; 25 MG/1; MG/1
1 TABLET ORAL DAILY
Qty: 30 TABLET | Refills: 0 | Status: SHIPPED | OUTPATIENT
Start: 2018-04-27 | End: 2018-05-25 | Stop reason: SDUPTHER

## 2018-04-27 RX ORDER — VENLAFAXINE HYDROCHLORIDE 75 MG/1
CAPSULE, EXTENDED RELEASE ORAL
Qty: 30 CAPSULE | Refills: 5 | Status: SHIPPED | OUTPATIENT
Start: 2018-04-27 | End: 2018-10-10 | Stop reason: SDUPTHER

## 2018-04-27 RX ORDER — ARIPIPRAZOLE 5 MG/1
TABLET ORAL
Qty: 30 TABLET | Refills: 0 | Status: SHIPPED | OUTPATIENT
Start: 2018-04-27 | End: 2018-05-25

## 2018-04-27 NOTE — PATIENT INSTRUCTIONS
1. CV- HTN- BP still slightly elevated and pt getting an ACEI cough. Will change the lisinopril HCT to diovan HCT.  2. PSYCH- depression with anxiety- will continue the effexor and add low dose abilify. Pt will check with his girlfriend if he is stopping breathing during his sleep.  3. RECHECK- 1mo

## 2018-05-25 ENCOUNTER — OFFICE VISIT (OUTPATIENT)
Dept: INTERNAL MEDICINE | Facility: CLINIC | Age: 33
End: 2018-05-25

## 2018-05-25 VITALS
TEMPERATURE: 97.7 F | WEIGHT: 261.4 LBS | SYSTOLIC BLOOD PRESSURE: 128 MMHG | HEIGHT: 67 IN | DIASTOLIC BLOOD PRESSURE: 74 MMHG | BODY MASS INDEX: 41.03 KG/M2

## 2018-05-25 DIAGNOSIS — I10 ESSENTIAL HYPERTENSION: Primary | ICD-10-CM

## 2018-05-25 DIAGNOSIS — M19.90 ARTHRITIS: ICD-10-CM

## 2018-05-25 DIAGNOSIS — J30.9 CHRONIC ALLERGIC RHINITIS, UNSPECIFIED SEASONALITY, UNSPECIFIED TRIGGER: ICD-10-CM

## 2018-05-25 DIAGNOSIS — F41.8 DEPRESSION WITH ANXIETY: ICD-10-CM

## 2018-05-25 PROCEDURE — 99214 OFFICE O/P EST MOD 30 MIN: CPT | Performed by: FAMILY MEDICINE

## 2018-05-25 RX ORDER — VALSARTAN AND HYDROCHLOROTHIAZIDE 160; 25 MG/1; MG/1
1 TABLET ORAL DAILY
Qty: 90 TABLET | Refills: 1 | Status: SHIPPED | OUTPATIENT
Start: 2018-05-25 | End: 2018-07-27

## 2018-05-25 RX ORDER — MELOXICAM 15 MG/1
TABLET ORAL
Qty: 30 TABLET | Refills: 1 | Status: SHIPPED | OUTPATIENT
Start: 2018-05-25 | End: 2018-07-27

## 2018-05-25 RX ORDER — FLUTICASONE PROPIONATE 50 MCG
2 SPRAY, SUSPENSION (ML) NASAL DAILY
Qty: 16 G | Refills: 5 | Status: SHIPPED | OUTPATIENT
Start: 2018-05-25 | End: 2020-03-31 | Stop reason: SDUPTHER

## 2018-05-25 NOTE — PROGRESS NOTES
Subjective   Lexa Conn is a 33 y.o. male.     History of Present Illness   Here for 1mo recheck mood and BP. Last seen 4/27/18 for 6mo routine. Was seen 7/3/17 for CPE. Pt was seen 4/21/17 as a new patient. Lab 7/3/17 demo normal CBC, TSH and B12. Had vit D 9.7; advised 5k. Had CMP with glu 67, calcium 11 with recheck 9/22/17 normal.     1. PSYCH- depression with anxiety, diagnosed at initial visit 4/21/17. At that time pt reported 1yr of symptoms of sad and anxious mood with anhedonia, feeling overwhelmed, fatigue, w/d, decreased motivation, crying, memory and concentration problems, feeling helpless and hopeless. No SI. Pt was started on Cymbalta and felt a little better but only reached 30% at 1mo and was still very tired. Was changed to Effexor 75 and reached goal by visit 8/24/17. However, at his last visit 4/27/18 he reported relapse with feeling sluggish with decreased motivation and irritability. No apnea symptoms. Was continued on Effexor and given the addition of low dose abilify. Today he reports increased appetite with the abilify. Has gained 8 lb. Is wearing his work boots but these do not equal 8 lb. Is having trouble with word finding on the abilify. Is less irritable with some increase in motivation.     2. CV-  HTN- Pt had elevated -143 over 88-97. Discussion re BP treatment vs weight loss as he had recently gained 40 lb. Did not respond to Topamax/ trokendi and was started on Lisinopril hCT. Did well except that at his visit 4/27/18 he had developed a cough. Was changed to diovan HCT. Today he reports the cough resolved off the ACEI.    3. RESP- allergies- today pt reports he does well with flonase. Needs Rx.    4. ORTHO- joint pain. Today pt reports he played football in high school and tore up his knees. Is having more knee pain since he gained weight.    The following portions of the patient's history were reviewed and updated as appropriate: current medications, past family history,  "past medical history, past social history, past surgical history and problem list.    Review of Systems   Cardiovascular: Negative for chest pain.   Gastrointestinal: Negative for abdominal distention and abdominal pain.   Skin: Negative for color change.   Neurological: Negative for tremors, speech difficulty and headaches.   Psychiatric/Behavioral: Negative for agitation and confusion.   All other systems reviewed and are negative.        Current Outpatient Prescriptions:   •  Cariprazine HCl (VRAYLAR) 3 MG capsule, Take 1 capsule by mouth Daily., Disp: 30 capsule, Rfl: 1  •  fluticasone (FLONASE) 50 MCG/ACT nasal spray, 2 sprays into each nostril Daily., Disp: 16 g, Rfl: 5  •  meloxicam (MOBIC) 15 MG tablet, 1 tab po qd prn pain or inflammation, Disp: 30 tablet, Rfl: 1  •  valsartan-hydrochlorothiazide (DIOVAN HCT) 160-25 MG per tablet, Take 1 tablet by mouth Daily., Disp: 90 tablet, Rfl: 1  •  venlafaxine XR (EFFEXOR-XR) 75 MG 24 hr capsule, 1 tab po qd, Disp: 30 capsule, Rfl: 5    Objective     /74   Temp 97.7 °F (36.5 °C)   Ht 170.2 cm (67\")   Wt 119 kg (261 lb 6.4 oz)   BMI 40.94 kg/m²     Physical Exam   Constitutional: He is oriented to person, place, and time. He appears well-developed and well-nourished.   HENT:   Right Ear: Tympanic membrane and ear canal normal.   Left Ear: Tympanic membrane and ear canal normal.   Mouth/Throat: Oropharynx is clear and moist.   Eyes: Conjunctivae and EOM are normal. Pupils are equal, round, and reactive to light.   Neck: No thyromegaly present.   Cardiovascular: Normal rate and regular rhythm.    Pulmonary/Chest: Effort normal and breath sounds normal.   Neurological: He is alert and oriented to person, place, and time.   Skin: Skin is warm and dry.   Psychiatric: He has a normal mood and affect.   Vitals reviewed.      Assessment/Plan   Lexa was seen today for follow-up.    Diagnoses and all orders for this visit:    Essential hypertension  -     " valsartan-hydrochlorothiazide (DIOVAN HCT) 160-25 MG per tablet; Take 1 tablet by mouth Daily.    Depression with anxiety    Arthritis    Chronic allergic rhinitis, unspecified seasonality, unspecified trigger  -     fluticasone (FLONASE) 50 MCG/ACT nasal spray; 2 sprays into each nostril Daily.    Other orders  -     Cariprazine HCl (VRAYLAR) 3 MG capsule; Take 1 capsule by mouth Daily.  -     meloxicam (MOBIC) 15 MG tablet; 1 tab po qd prn pain or inflammation        1. CV- HTN- BP at goal. Will add ACEI to allergy list. RF diovan HCT x6mo today.  2. PSYCH- depression with anxiety- continue effexor. Will change the abilify tovarylar and titrate dose vryalar to 3mg. Will start with samples as he will likely need a PA.  3. RESP- allergies- will write for flonase today. Advised to apply with a Qtip.  4. ORTHO- arthritis- education re the importance of stretching with handout provided. Will start mobic and pt is to take it daily for a week and then as needed. Rx #30 x2.  5. RECHECK- 6wk (schedule conflicts for 4wk)

## 2018-05-25 NOTE — PATIENT INSTRUCTIONS
1. CV- HTN- BP at goal. Will add ACEI to allergy list. RF diovan HCT x6mo today.  2. PSYCH- depression with anxiety- continue effexor. Will change the abilify tovarylar and titrate dose vryalar to 3mg. Will start with samples as he will likely need a PA.  3. RESP- allergies- will write for flonase today. Advised to apply with a Qtip.  4. ORTHO- arthritis- education re the importance of stretching with handout provided. Will start mobic and pt is to take it daily for a week and then as needed. Rx #30 x2.  5. RECHECK- 6wk (schedule conflicts for 4wk)

## 2018-07-27 ENCOUNTER — OFFICE VISIT (OUTPATIENT)
Dept: INTERNAL MEDICINE | Facility: CLINIC | Age: 33
End: 2018-07-27

## 2018-07-27 VITALS
BODY MASS INDEX: 41.75 KG/M2 | DIASTOLIC BLOOD PRESSURE: 94 MMHG | TEMPERATURE: 97.5 F | WEIGHT: 266 LBS | HEART RATE: 109 BPM | OXYGEN SATURATION: 98 % | SYSTOLIC BLOOD PRESSURE: 160 MMHG | HEIGHT: 67 IN | RESPIRATION RATE: 16 BRPM

## 2018-07-27 DIAGNOSIS — M19.90 ARTHRITIS: ICD-10-CM

## 2018-07-27 DIAGNOSIS — I10 ESSENTIAL HYPERTENSION: ICD-10-CM

## 2018-07-27 DIAGNOSIS — F41.8 DEPRESSION WITH ANXIETY: Primary | ICD-10-CM

## 2018-07-27 PROCEDURE — 99214 OFFICE O/P EST MOD 30 MIN: CPT | Performed by: FAMILY MEDICINE

## 2018-07-27 RX ORDER — CELECOXIB 200 MG/1
200 CAPSULE ORAL DAILY
Qty: 30 CAPSULE | Refills: 0 | Status: SHIPPED | OUTPATIENT
Start: 2018-07-27 | End: 2018-08-22 | Stop reason: SDUPTHER

## 2018-07-27 RX ORDER — IRBESARTAN AND HYDROCHLOROTHIAZIDE 150; 12.5 MG/1; MG/1
1 TABLET, FILM COATED ORAL DAILY
Qty: 90 TABLET | Refills: 1 | Status: SHIPPED | OUTPATIENT
Start: 2018-07-27 | End: 2019-01-25 | Stop reason: SDUPTHER

## 2018-07-27 RX ORDER — TOPIRAMATE 25 MG/1
TABLET ORAL
Qty: 60 TABLET | Refills: 0 | Status: SHIPPED | OUTPATIENT
Start: 2018-07-27 | End: 2018-08-22 | Stop reason: SDUPTHER

## 2018-07-27 NOTE — PATIENT INSTRUCTIONS
1. PSYCH- mood improving on vraylar and effexor combo. Continue this and will recheck in 1-2mo to ensure he fully reaches goal  2. ORTHO- arthritis- will change the mobic to celebrex and work on weight loss. Pt given [employer] handout with discussion. Will also do trial with topamax  3. CV- HTN- discussed that diovan HCT has been recalled. Will change to irbesartan HCT.  4. RECHECK- 1mo

## 2018-07-27 NOTE — PROGRESS NOTES
Subjective   Lexa Conn is a 33 y.o. male.     History of Present Illness   Here for 6wk recheck. (had 4wk schedule conflict). Last seen 5/25/18 for 1mo recheck mood and BP. Last seen 4/27/18 for 6mo routine. Was seen 7/3/17 for CPE. Pt was seen 4/21/17 as a new patient. Lab 7/3/17 demo normal CBC, TSH and B12. Had vit D 9.7; advised 5k. Had CMP with glu 67, calcium 11 with recheck 9/22/17 normal.     1.PSYCH- depression with anxiety, diagnosed at initial visit 4/21/17. At that time pt reported 1yr of symptoms of sad and anxious mood with anhedonia, feeling overwhelmed, fatigue, w/d, decreased motivation, crying, memory and concentration problems, feeling helpless and hopeless. No SI. Pt was started on Cymbalta and felt a little better but only reached 30% at 1mo and was still very tired. Was changed to Effexor 75 and reached goal by visit 8/24/17. However, at his last visit 4/27/18 he reported relapse with feeling sluggish with decreased motivation and irritability (o apnea symptoms). Was continued on Effexor and tried on abilify but gained 8 lb and had word finding problems; was less irritable and had increased motivation. Abilify was changed to vraylar. Today he reports he has not had any S/E with the vraylar. His mood is getting better but he is still eating too much.     2. ORTHO- arthritis in knees. Started after he played football in high school. Was having more pain post recent weight gain. Was given Mobic. Today he reports he did not respond to this. Will not sleep well due to knee throbbing.     3.CV-  HTN- Pt had elevated -143 over 88-97. Discussion re BP treatment vs weight loss as he had recently gained 40 lb. Did not respond to Topamax/ trokendi and was started on Lisinopril hCT. Did well except that at his visit 4/27/18 he had developed a cough. Was changed to diovan HCT and cough resolved.  4. RESP- allergies- currently on Flonase.     The following portions of the patient's history were  "reviewed and updated as appropriate: allergies, past family history, past medical history, past social history, past surgical history and problem list.    Review of Systems   Constitutional: Negative.    HENT: Negative.    Eyes: Negative.    Respiratory: Negative.    Cardiovascular: Negative.  Negative for chest pain.   Gastrointestinal: Negative.  Negative for abdominal distention and abdominal pain.   Endocrine: Negative.    Genitourinary: Negative.    Musculoskeletal: Negative.    Skin: Negative.  Negative for color change.   Allergic/Immunologic: Negative.    Neurological: Negative.  Negative for tremors, speech difficulty and headaches.   Hematological: Negative.    Psychiatric/Behavioral: Negative.  Negative for agitation and confusion.   All other systems reviewed and are negative.        Current Outpatient Prescriptions:   •  Cariprazine HCl (VRAYLAR) 3 MG capsule, Take 1 capsule by mouth Daily., Disp: 30 capsule, Rfl: 5  •  celecoxib (CELEBREX) 200 MG capsule, Take 1 capsule by mouth Daily., Disp: 30 capsule, Rfl: 0  •  fluticasone (FLONASE) 50 MCG/ACT nasal spray, 2 sprays into each nostril Daily., Disp: 16 g, Rfl: 5  •  irbesartan-hydrochlorothiazide (AVALIDE) 150-12.5 MG tablet, Take 1 tablet by mouth Daily., Disp: 90 tablet, Rfl: 1  •  topiramate (TOPAMAX) 25 MG tablet, 1 tab po qd x1wk then 2 tabs po qd, Disp: 60 tablet, Rfl: 0  •  venlafaxine XR (EFFEXOR-XR) 75 MG 24 hr capsule, 1 tab po qd, Disp: 30 capsule, Rfl: 5    Objective     /94   Pulse 109   Temp 97.5 °F (36.4 °C) (Temporal Artery )   Resp 16   Ht 170.2 cm (67\")   Wt 121 kg (266 lb)   SpO2 98%   BMI 41.66 kg/m²     Physical Exam   Constitutional: He is oriented to person, place, and time. He appears well-developed and well-nourished.   HENT:   Right Ear: Tympanic membrane and ear canal normal.   Left Ear: Tympanic membrane and ear canal normal.   Mouth/Throat: Oropharynx is clear and moist.   Eyes: Pupils are equal, round, and " reactive to light. Conjunctivae and EOM are normal.   Neck: No thyromegaly present.   Cardiovascular: Normal rate and regular rhythm.    Pulmonary/Chest: Effort normal and breath sounds normal.   Neurological: He is alert and oriented to person, place, and time.   Skin: Skin is warm and dry.   Psychiatric: He has a normal mood and affect.   Vitals reviewed.      Assessment/Plan   Diagnoses and all orders for this visit:    Depression with anxiety  -     Cariprazine HCl (VRAYLAR) 3 MG capsule; Take 1 capsule by mouth Daily.    Arthritis  -     celecoxib (CELEBREX) 200 MG capsule; Take 1 capsule by mouth Daily.  -     topiramate (TOPAMAX) 25 MG tablet; 1 tab po qd x1wk then 2 tabs po qd    Essential hypertension  -     irbesartan-hydrochlorothiazide (AVALIDE) 150-12.5 MG tablet; Take 1 tablet by mouth Daily.      1. PSYCH- mood improving on vraylar and effexor combo. Continue this and will recheck in 1-2mo to ensure he fully reaches goal  2. ORTHO- arthritis- will change the mobic to celebrex and work on weight loss. Pt given [employer] handout with discussion. Will also do trial with topamax  3. CV- HTN- discussed that diovan HCT has been recalled. Will change to irbesartan HCT.  4. RECHECK- 1mo

## 2018-08-22 DIAGNOSIS — M19.90 ARTHRITIS: ICD-10-CM

## 2018-08-22 RX ORDER — TOPIRAMATE 25 MG/1
TABLET ORAL
Qty: 60 TABLET | Refills: 0 | Status: SHIPPED | OUTPATIENT
Start: 2018-08-22 | End: 2018-08-31 | Stop reason: SDUPTHER

## 2018-08-22 RX ORDER — CELECOXIB 200 MG/1
CAPSULE ORAL
Qty: 30 CAPSULE | Refills: 0 | Status: SHIPPED | OUTPATIENT
Start: 2018-08-22 | End: 2018-08-31 | Stop reason: SDUPTHER

## 2018-08-31 ENCOUNTER — OFFICE VISIT (OUTPATIENT)
Dept: INTERNAL MEDICINE | Facility: CLINIC | Age: 33
End: 2018-08-31

## 2018-08-31 VITALS
SYSTOLIC BLOOD PRESSURE: 130 MMHG | TEMPERATURE: 98.2 F | DIASTOLIC BLOOD PRESSURE: 86 MMHG | HEIGHT: 67 IN | WEIGHT: 265.2 LBS | BODY MASS INDEX: 41.62 KG/M2

## 2018-08-31 DIAGNOSIS — M19.90 ARTHRITIS: ICD-10-CM

## 2018-08-31 PROCEDURE — 99213 OFFICE O/P EST LOW 20 MIN: CPT | Performed by: FAMILY MEDICINE

## 2018-08-31 RX ORDER — CELECOXIB 200 MG/1
200 CAPSULE ORAL DAILY
Qty: 90 CAPSULE | Refills: 0 | Status: SHIPPED | OUTPATIENT
Start: 2018-08-31 | End: 2018-12-07 | Stop reason: SDUPTHER

## 2018-08-31 RX ORDER — TOPIRAMATE 100 MG/1
100 TABLET, FILM COATED ORAL DAILY
Qty: 90 TABLET | Refills: 0 | Status: SHIPPED | OUTPATIENT
Start: 2018-08-31 | End: 2018-12-07

## 2018-10-10 DIAGNOSIS — F41.8 DEPRESSION WITH ANXIETY: ICD-10-CM

## 2018-10-11 RX ORDER — VENLAFAXINE HYDROCHLORIDE 75 MG/1
CAPSULE, EXTENDED RELEASE ORAL
Qty: 30 CAPSULE | Refills: 4 | Status: SHIPPED | OUTPATIENT
Start: 2018-10-11 | End: 2019-03-28 | Stop reason: SDUPTHER

## 2018-12-07 ENCOUNTER — OFFICE VISIT (OUTPATIENT)
Dept: INTERNAL MEDICINE | Facility: CLINIC | Age: 33
End: 2018-12-07

## 2018-12-07 VITALS
WEIGHT: 263.6 LBS | HEIGHT: 67 IN | DIASTOLIC BLOOD PRESSURE: 76 MMHG | TEMPERATURE: 97.6 F | SYSTOLIC BLOOD PRESSURE: 124 MMHG | BODY MASS INDEX: 41.37 KG/M2

## 2018-12-07 DIAGNOSIS — R53.83 FATIGUE, UNSPECIFIED TYPE: ICD-10-CM

## 2018-12-07 DIAGNOSIS — M19.90 ARTHRITIS: ICD-10-CM

## 2018-12-07 DIAGNOSIS — F41.8 DEPRESSION WITH ANXIETY: Primary | ICD-10-CM

## 2018-12-07 PROCEDURE — 99214 OFFICE O/P EST MOD 30 MIN: CPT | Performed by: FAMILY MEDICINE

## 2018-12-07 RX ORDER — MODAFINIL 200 MG/1
200 TABLET ORAL DAILY
Qty: 30 TABLET | Refills: 0 | Status: SHIPPED | OUTPATIENT
Start: 2018-12-07 | End: 2019-01-25 | Stop reason: SDUPTHER

## 2018-12-07 RX ORDER — CELECOXIB 200 MG/1
200 CAPSULE ORAL DAILY
Qty: 90 CAPSULE | Refills: 1 | Status: SHIPPED | OUTPATIENT
Start: 2018-12-07 | End: 2019-03-28 | Stop reason: SDUPTHER

## 2018-12-07 NOTE — PATIENT INSTRUCTIONS
1. PSYCH- depression with anxiety- mood ok off vraylar. Will do the ADHD in office testing.  2. FATIGUE- discussed that this could relate to mood issues or weight. Will do a trial with provigil.   3. RECHECK- 1mo recheck provigil and routine refills

## 2018-12-07 NOTE — PROGRESS NOTES
Subjective   Lexa Conn is a 33 y.o. male.     History of Present Illness   Her for 3mo recheck OA and weight. Last seen 8/31/18 for 1mo recheck arthritis. Was seen 7/27/18 for 6wk recheck. (had 4wk schedule conflict). Last seen 5/25/18 for 1mo recheck mood and BP. Last seen 4/27/18 for 6mo routine. Was seen 7/3/17 for CPE. Pt was seen 4/21/17 as a new patient. Lab 7/3/17 demo normal CBC, TSH and B12. Had vit D 9.7; advised 5k. Had CMP with glu 67, calcium 11 with recheck 9/22/17 normal.     1.ORTHO- arthritis in knees. Started after he played football in high school. Was having more pain post recent weight gain. Did not respond to Mobic so was changed to Celebrex with addition of Topamax. Lost 1 lb to 265 lb. Had cut down on carbs and was trying to exercise. Felt the Celebrex did help some. Was continued on this combo for 3mo. Today she reports he does feel the celebrex is helping; he can tell if he doesn't take it. He has lost 1 additional lb (total of 2 lb) to 263.5 lb.        2.PSYCH- depression with anxiety, diagnosed at initial visit 4/21/17. At that time pt reported 1yr of symptoms of sad and anxious mood with anhedonia, feeling overwhelmed, fatigue, w/d, decreased motivation, crying, memory and concentration problems, feeling helpless and hopeless. No SI. Pt was started on Cymbalta and felt a little better but only reached 30% at 1mo and was still very tired. Was changed to Effexor 75 and reached goal by visit 8/24/17. However, at his last visit 4/27/18 he reported relapse with feeling sluggish with decreased motivation and irritability (o apnea symptoms). Was continued on Effexor and tried on abilify but gained 8 lb and had word finding problems; was less irritable and had increased motivation. Abilify was changed to vraylar and pt did well for 1mo; was still eating too much.  Today pt reports his insurance changed in October and they do not cover vraylar so he stopped this 2mo ago. His mood has not  "changed off of it. He is still on effexor. He gets anxious still but otherwise doing ok.     3.CV-  HTN- Pt had elevated -143 over 88-97. Discussion re BP treatment vs weight loss as he had recently gained 40 lb. Did not respond to Topamax/ trokendi and was started on Lisinopril hCT. Did well except that at his visit 4/27/18 he had developed a cough. Was changed to diovan HCT and cough resolved. Was last changed to irbesartan HCT due to diovan recall.  4. RESP- allergies- currently on Flonase.     The following portions of the patient's history were reviewed and updated as appropriate: current medications, past family history, past medical history, past social history, past surgical history and problem list.    Review of Systems   Cardiovascular: Negative for chest pain.   Gastrointestinal: Negative for abdominal distention and abdominal pain.   Skin: Negative for color change.   Neurological: Negative for tremors, speech difficulty and headaches.   Psychiatric/Behavioral: Negative for agitation and confusion.   All other systems reviewed and are negative.        Current Outpatient Medications:   •  celecoxib (CeleBREX) 200 MG capsule, Take 1 capsule by mouth Daily., Disp: 90 capsule, Rfl: 1  •  fluticasone (FLONASE) 50 MCG/ACT nasal spray, 2 sprays into each nostril Daily., Disp: 16 g, Rfl: 5  •  irbesartan-hydrochlorothiazide (AVALIDE) 150-12.5 MG tablet, Take 1 tablet by mouth Daily., Disp: 90 tablet, Rfl: 1  •  modafinil (PROVIGIL) 200 MG tablet, Take 1 tablet by mouth Daily., Disp: 30 tablet, Rfl: 0  •  venlafaxine XR (EFFEXOR-XR) 75 MG 24 hr capsule, TAKE ONE CAPSULE BY MOUTH DAILY, Disp: 30 capsule, Rfl: 4    Objective     /76   Temp 97.6 °F (36.4 °C)   Ht 170.2 cm (67\")   Wt 120 kg (263 lb 9.6 oz)   BMI 41.29 kg/m²     Physical Exam   Constitutional: He is oriented to person, place, and time. He appears well-developed and well-nourished.   HENT:   Right Ear: Tympanic membrane and ear canal " normal.   Left Ear: Tympanic membrane and ear canal normal.   Mouth/Throat: Oropharynx is clear and moist.   Eyes: Conjunctivae and EOM are normal. Pupils are equal, round, and reactive to light.   Neck: No thyromegaly present.   Cardiovascular: Normal rate and regular rhythm.   Pulmonary/Chest: Effort normal and breath sounds normal.   Neurological: He is alert and oriented to person, place, and time.   Skin: Skin is warm and dry.   Psychiatric: He has a normal mood and affect.   Vitals reviewed.      Assessment/Plan   Lexa was seen today for follow-up.    Diagnoses and all orders for this visit:    Depression with anxiety    Arthritis  -     celecoxib (CeleBREX) 200 MG capsule; Take 1 capsule by mouth Daily.    Fatigue, unspecified type  -     modafinil (PROVIGIL) 200 MG tablet; Take 1 tablet by mouth Daily.        1. PSYCH- depression with anxiety- mood ok off vraylar. Will do the ADHD in office testing. Score 38 (score over 30 considered ADHD)  2. FATIGUE- discussed that this could relate to mood issues or weight. Will do a trial with provigil.   3. RECHECK- 1mo

## 2018-12-17 ENCOUNTER — TELEPHONE (OUTPATIENT)
Dept: INTERNAL MEDICINE | Facility: CLINIC | Age: 33
End: 2018-12-17

## 2019-01-25 ENCOUNTER — OFFICE VISIT (OUTPATIENT)
Dept: INTERNAL MEDICINE | Facility: CLINIC | Age: 34
End: 2019-01-25

## 2019-01-25 VITALS
BODY MASS INDEX: 42.03 KG/M2 | SYSTOLIC BLOOD PRESSURE: 126 MMHG | TEMPERATURE: 97.4 F | DIASTOLIC BLOOD PRESSURE: 88 MMHG | HEIGHT: 67 IN | WEIGHT: 267.8 LBS

## 2019-01-25 DIAGNOSIS — F41.8 DEPRESSION WITH ANXIETY: Primary | ICD-10-CM

## 2019-01-25 DIAGNOSIS — R53.83 FATIGUE, UNSPECIFIED TYPE: ICD-10-CM

## 2019-01-25 DIAGNOSIS — H61.23 BILATERAL IMPACTED CERUMEN: ICD-10-CM

## 2019-01-25 DIAGNOSIS — I10 ESSENTIAL HYPERTENSION: ICD-10-CM

## 2019-01-25 PROCEDURE — 99213 OFFICE O/P EST LOW 20 MIN: CPT | Performed by: FAMILY MEDICINE

## 2019-01-25 RX ORDER — IRBESARTAN AND HYDROCHLOROTHIAZIDE 150; 12.5 MG/1; MG/1
1 TABLET, FILM COATED ORAL DAILY
Qty: 90 TABLET | Refills: 0 | Status: SHIPPED | OUTPATIENT
Start: 2019-01-25 | End: 2019-03-28 | Stop reason: SDUPTHER

## 2019-01-25 RX ORDER — MODAFINIL 200 MG/1
200 TABLET ORAL DAILY
Qty: 30 TABLET | Refills: 2 | Status: SHIPPED | OUTPATIENT
Start: 2019-01-25 | End: 2019-03-28 | Stop reason: SDUPTHER

## 2019-01-25 NOTE — PATIENT INSTRUCTIONS
1. PSYCH- doing well on effexor and provigil combo. Will sign contract and RF provigil x3mo now and then reassess with plan for 6mo RF then.  2. RESP- cerumen impaction. Discussed that this is deep. Will irrigate but if not successful, pt is to see ENT for suction removal.   3. RECHECK- 3mo

## 2019-01-25 NOTE — PROGRESS NOTES
Subjective   Lexa Conn is a 33 y.o. male.     History of Present Illness   Here for 1mo recheck mood on provigil. Last seen 12/7/18 for 3mo recheck OA and weight. Last seen 8/31/18 for 1mo recheck arthritis. Was seen 7/27/18 for 6wk recheck. (had 4wk schedule conflict). Last seen 5/25/18 for 1mo recheck mood and BP. Last seen 4/27/18 for 6mo routine. Was seen 7/3/17 for CPE. Pt was seen 4/21/17 as a new patient. Lab 7/3/17 demo normal CBC, TSH and B12. Had vit D 9.7; advised 5k. Had CMP with glu 67, calcium 11 with recheck 9/22/17 normal.     1..PSYCH- depression with anxiety, diagnosed at initial visit 4/21/17. At that time pt reported 1yr of symptoms of sad and anxious mood with anhedonia, feeling overwhelmed, fatigue, w/d, decreased motivation, crying, memory and concentration problems, feeling helpless and hopeless. No SI. Did not respond adequately to Cymbalta but did well with effexor by visit 8/24/17. However, at his last visit 4/27/18 he reported relapse with feeling sluggish with decreased motivation and irritability (apnea symptoms). Respondedd to abilify but gained 8 lb; vraylar was not covered. Was still on Effexor and doing ok other than feeling anxious. He was + on ADHD screen. Was given trial with addition of Provigil. Today he reports no S/E. He felt better and then ran out when he had to reschedule.  It helped with both the motivation and irritability but not much with the energy.    2. RESP- allergies- currently on Flonase. Today pt reports he has been having ear pain for for a week, worse on left. No sinus symptoms; on flonase. Feels it is wax. Is using debrox.    3. ORTHO- arthritis in knees. Started after he played football in high school. Was having more pain post recent weight gain. Did not respond to Mobic so was changed to Celebrex with addition of Topamax. Lost 1 lb to 265 lb. Had cut down on carbs and was trying to exercise. Felt the Celebrex did help. He has total of 2 lb to 263.5  "lb.     4.CV-  HTN- Pt had elevated -143 over 88-97. Discussion re BP treatment vs weight loss as he had recently gained 40 lb. Did not respond to Topamax/ trokendi and was started on Lisinopril hCT. Did well except that at his visit 4/27/18 he had developed a cough. Was changed to diovan HCT and cough resolved. Was last changed to irbesartan HCT due to diovan recall.    The following portions of the patient's history were reviewed and updated as appropriate: current medications, past family history, past medical history, past social history, past surgical history and problem list.    Review of Systems   HENT: Positive for ear pain (both, worse on left).    Cardiovascular: Negative for chest pain.   Gastrointestinal: Negative for abdominal distention and abdominal pain.   Skin: Negative for color change.   Neurological: Negative for tremors, speech difficulty and headaches.   Psychiatric/Behavioral: Negative for agitation and confusion.   All other systems reviewed and are negative.        Current Outpatient Medications:   •  celecoxib (CeleBREX) 200 MG capsule, Take 1 capsule by mouth Daily., Disp: 90 capsule, Rfl: 1  •  fluticasone (FLONASE) 50 MCG/ACT nasal spray, 2 sprays into each nostril Daily., Disp: 16 g, Rfl: 5  •  irbesartan-hydrochlorothiazide (AVALIDE) 150-12.5 MG tablet, Take 1 tablet by mouth Daily., Disp: 90 tablet, Rfl: 0  •  modafinil (PROVIGIL) 200 MG tablet, Take 1 tablet by mouth Daily., Disp: 30 tablet, Rfl: 2  •  venlafaxine XR (EFFEXOR-XR) 75 MG 24 hr capsule, TAKE ONE CAPSULE BY MOUTH DAILY, Disp: 30 capsule, Rfl: 4    Objective     /88   Temp 97.4 °F (36.3 °C)   Ht 170.2 cm (67\")   Wt 121 kg (267 lb 12.8 oz)   BMI 41.94 kg/m²     Physical Exam   Constitutional: He is oriented to person, place, and time. He appears well-developed and well-nourished.   HENT:   Right Ear: Tympanic membrane and ear canal normal.   Left Ear: Tympanic membrane and ear canal normal.   Mouth/Throat: " Oropharynx is clear and moist.   Eyes: Conjunctivae and EOM are normal. Pupils are equal, round, and reactive to light.   Neck: No thyromegaly present.   Cardiovascular: Normal rate and regular rhythm.   Pulmonary/Chest: Effort normal and breath sounds normal.   Neurological: He is alert and oriented to person, place, and time.   Skin: Skin is warm and dry.   Psychiatric: He has a normal mood and affect.   Vitals reviewed.      Assessment/Plan   Lexa was seen today for follow-up.    Diagnoses and all orders for this visit:    Depression with anxiety    Bilateral impacted cerumen    Essential hypertension  -     irbesartan-hydrochlorothiazide (AVALIDE) 150-12.5 MG tablet; Take 1 tablet by mouth Daily.    Fatigue, unspecified type  -     modafinil (PROVIGIL) 200 MG tablet; Take 1 tablet by mouth Daily.        1. PSYCH- doing well on effexor and provigil combo. Will sign contract and RF provigil x3mo now and then reassess with plan for 6mo RF then.  2. RESP- cerumen impaction. Discussed that this is deep. Will irrigate but if not successful, pt is to see ENT for suction removal.   3. RECHECK- 3mo

## 2019-03-28 ENCOUNTER — OFFICE VISIT (OUTPATIENT)
Dept: INTERNAL MEDICINE | Facility: CLINIC | Age: 34
End: 2019-03-28

## 2019-03-28 VITALS
SYSTOLIC BLOOD PRESSURE: 128 MMHG | WEIGHT: 271.8 LBS | HEIGHT: 67 IN | BODY MASS INDEX: 42.66 KG/M2 | DIASTOLIC BLOOD PRESSURE: 82 MMHG | TEMPERATURE: 97.6 F

## 2019-03-28 DIAGNOSIS — M19.90 ARTHRITIS: ICD-10-CM

## 2019-03-28 DIAGNOSIS — I10 ESSENTIAL HYPERTENSION: ICD-10-CM

## 2019-03-28 DIAGNOSIS — R53.83 FATIGUE, UNSPECIFIED TYPE: ICD-10-CM

## 2019-03-28 DIAGNOSIS — Z00.00 ANNUAL PHYSICAL EXAM: Primary | ICD-10-CM

## 2019-03-28 DIAGNOSIS — F41.8 DEPRESSION WITH ANXIETY: ICD-10-CM

## 2019-03-28 LAB
25(OH)D3 SERPL-MCNC: 21.3 NG/ML
ALBUMIN SERPL-MCNC: 4.92 G/DL (ref 3.2–4.8)
ALBUMIN/GLOB SERPL: 2.5 G/DL (ref 1.5–2.5)
ALP SERPL-CCNC: 96 U/L (ref 25–100)
ALT SERPL W P-5'-P-CCNC: 63 U/L (ref 7–40)
ANION GAP SERPL CALCULATED.3IONS-SCNC: 11 MMOL/L (ref 3–11)
ARTICHOKE IGE QN: 176 MG/DL (ref 0–130)
AST SERPL-CCNC: 36 U/L (ref 0–33)
BASOPHILS # BLD AUTO: 0.02 10*3/MM3 (ref 0–0.2)
BASOPHILS NFR BLD AUTO: 0.3 % (ref 0–1)
BILIRUB SERPL-MCNC: 0.5 MG/DL (ref 0.3–1.2)
BUN BLD-MCNC: 16 MG/DL (ref 9–23)
BUN/CREAT SERPL: 16.8 (ref 7–25)
CALCIUM SPEC-SCNC: 10.1 MG/DL (ref 8.7–10.4)
CHLORIDE SERPL-SCNC: 104 MMOL/L (ref 99–109)
CHOLEST SERPL-MCNC: 236 MG/DL (ref 0–200)
CO2 SERPL-SCNC: 25 MMOL/L (ref 20–31)
CREAT BLD-MCNC: 0.95 MG/DL (ref 0.6–1.3)
DEPRECATED RDW RBC AUTO: 46.8 FL (ref 37–54)
EOSINOPHIL # BLD AUTO: 0.08 10*3/MM3 (ref 0–0.3)
EOSINOPHIL NFR BLD AUTO: 1.3 % (ref 0–3)
ERYTHROCYTE [DISTWIDTH] IN BLOOD BY AUTOMATED COUNT: 14.1 % (ref 11.3–14.5)
GFR SERPL CREATININE-BSD FRML MDRD: 91 ML/MIN/1.73
GLOBULIN UR ELPH-MCNC: 2 GM/DL
GLUCOSE BLD-MCNC: 87 MG/DL (ref 70–100)
HCT VFR BLD AUTO: 46.8 % (ref 38.9–50.9)
HDLC SERPL-MCNC: 46 MG/DL (ref 40–60)
HGB BLD-MCNC: 15.7 G/DL (ref 13.1–17.5)
IMM GRANULOCYTES # BLD AUTO: 0.07 10*3/MM3 (ref 0–0.05)
IMM GRANULOCYTES NFR BLD AUTO: 1.2 % (ref 0–0.6)
LYMPHOCYTES # BLD AUTO: 1.63 10*3/MM3 (ref 0.6–4.8)
LYMPHOCYTES NFR BLD AUTO: 27.3 % (ref 24–44)
MCH RBC QN AUTO: 30.4 PG (ref 27–31)
MCHC RBC AUTO-ENTMCNC: 33.5 G/DL (ref 32–36)
MCV RBC AUTO: 90.7 FL (ref 80–99)
MONOCYTES # BLD AUTO: 0.56 10*3/MM3 (ref 0–1)
MONOCYTES NFR BLD AUTO: 9.4 % (ref 0–12)
NEUTROPHILS # BLD AUTO: 3.62 10*3/MM3 (ref 1.5–8.3)
NEUTROPHILS NFR BLD AUTO: 60.5 % (ref 41–71)
PLATELET # BLD AUTO: 267 10*3/MM3 (ref 150–450)
PMV BLD AUTO: 9.9 FL (ref 6–12)
POTASSIUM BLD-SCNC: 3.8 MMOL/L (ref 3.5–5.5)
PROT SERPL-MCNC: 6.9 G/DL (ref 5.7–8.2)
RBC # BLD AUTO: 5.16 10*6/MM3 (ref 4.2–5.76)
SODIUM BLD-SCNC: 140 MMOL/L (ref 132–146)
TRIGL SERPL-MCNC: 234 MG/DL (ref 0–150)
TSH SERPL DL<=0.05 MIU/L-ACNC: 1.06 MIU/ML (ref 0.35–5.35)
VIT B12 BLD-MCNC: 514 PG/ML (ref 211–911)
WBC NRBC COR # BLD: 5.98 10*3/MM3 (ref 3.5–10.8)

## 2019-03-28 PROCEDURE — 80061 LIPID PANEL: CPT | Performed by: FAMILY MEDICINE

## 2019-03-28 PROCEDURE — 80053 COMPREHEN METABOLIC PANEL: CPT | Performed by: FAMILY MEDICINE

## 2019-03-28 PROCEDURE — 85025 COMPLETE CBC W/AUTO DIFF WBC: CPT | Performed by: FAMILY MEDICINE

## 2019-03-28 PROCEDURE — 84443 ASSAY THYROID STIM HORMONE: CPT | Performed by: FAMILY MEDICINE

## 2019-03-28 PROCEDURE — 99395 PREV VISIT EST AGE 18-39: CPT | Performed by: FAMILY MEDICINE

## 2019-03-28 PROCEDURE — 82607 VITAMIN B-12: CPT | Performed by: FAMILY MEDICINE

## 2019-03-28 PROCEDURE — 82306 VITAMIN D 25 HYDROXY: CPT | Performed by: FAMILY MEDICINE

## 2019-03-28 RX ORDER — IRBESARTAN AND HYDROCHLOROTHIAZIDE 150; 12.5 MG/1; MG/1
1 TABLET, FILM COATED ORAL DAILY
Qty: 90 TABLET | Refills: 0 | Status: SHIPPED | OUTPATIENT
Start: 2019-03-28 | End: 2019-04-16 | Stop reason: SDUPTHER

## 2019-03-28 RX ORDER — VENLAFAXINE HYDROCHLORIDE 75 MG/1
75 CAPSULE, EXTENDED RELEASE ORAL DAILY
Qty: 30 CAPSULE | Refills: 4 | Status: SHIPPED | OUTPATIENT
Start: 2019-03-28 | End: 2019-04-16 | Stop reason: SDUPTHER

## 2019-03-28 RX ORDER — MODAFINIL 200 MG/1
200 TABLET ORAL DAILY
Qty: 30 TABLET | Refills: 2 | Status: SHIPPED | OUTPATIENT
Start: 2019-03-28 | End: 2019-08-02 | Stop reason: SDUPTHER

## 2019-03-28 RX ORDER — CELECOXIB 200 MG/1
200 CAPSULE ORAL DAILY
Qty: 90 CAPSULE | Refills: 1 | Status: SHIPPED | OUTPATIENT
Start: 2019-03-28 | End: 2019-04-16 | Stop reason: SDUPTHER

## 2019-03-28 NOTE — PATIENT INSTRUCTIONS
1. CPE- screening labs today. ABC's of mole surveillance instructed.    2. CV- HTN- BP at goal. RF irbesartan HCT x6mo today  3. PSYCH- mood at goal. RF effexor and provigil x6mo today  4. RECHECK- 6mo Dr Gonsalez

## 2019-03-28 NOTE — PROGRESS NOTES
Subjective   Lexa Conn is a 33 y.o. male.   History of Present Illness   Here for CPE. Last seen 1/25/19 for 1mo recheck mood on provigil. Last seen 12/7/18 for 3mo recheck OA and weight. Last seen 8/31/18 for 1mo recheck arthritis. Was seen 7/27/18 for 6wk recheck. (had 4wk schedule conflict). Last seen 5/25/18 for 1mo recheck mood and BP. Last seen 4/27/18 for 6mo routine. Was seen 7/3/17 for CPE. Pt was seen 4/21/17 as a new patient. Lab 7/3/17 demo normal CBC, TSH and B12. Had vit D 9.7; advised 5k. Had CMP with glu 67, calcium 11 with recheck 9/22/17 normal.     1. PSYCH- depression with anxiety, diagnosed at initial visit 4/21/17. At that time pt reported 1yr of sad and anxious mood with anhedonia, feeling overwhelmed, fatigue, w/d, decreased motivation, crying, memory and concentration problems, feeling helpless and hopeless. No SI. Did not respond adequately to Cymbalta but did well with effexor by visit 8/24/17. However, at his visit 4/27/18 he reported relapse with feeling sluggish with decreased motivation and irritability (apnea symptoms). Respondedd to abilify but gained 8 lb; vraylar was not covered. Was still on Effexor and doing ok other than feeling anxious. He was + on ADHD screen. Was given trial with addition of Provigil. Did well with improved motivation and irritability (no improvement in energy level). Was continued on Effexor with Provigil. Today he reports he feels at goal re his mood and his concentration. Does get down on occasion but does not huyen down. Still does not have energy.     2. ORTHO- arthritis in knees. Started after he played football in high school. Was having more pain post recent weight gain. Did not respond to Mobic so was changed to Celebrex with addition of Topamax. Lost 1 lb to 265 lb. Had cut down on carbs and was trying to exercise. Felt the Celebrex did help. Has lost a total of 2 lb to 263.5 lb.  Pt is up 8 lb today 272 lb.    3. CV-  HTN- diagnosed 2017. Pt  "developed ACEI cough and was changed to diovan HCT 4/27/18; was subsequently changed to irbesartan HCT due to diovan recall. Today he reports no CP, palpitations, dyspnea or edema.     4. CPE- previous done 7/3/17  CV- risk factors: see above  GI- no rectal bleeding. No fam hx colon ca. Previous colonoscopy: none  - no prostate or bladder c/o. No ED. Father had prostate ca at 67yo; now has lymphoma.  FAM HX- sister had skin ca, unknown kind  RESP- never a smoker  Immunizations: Last TDaP: 7/3/17. No previous pneumovax or zostavax.  New concerns- has had recurrent wax impaction- advised to see ENT. Today he reports he has recheck pending today.     The following portions of the patient's history were reviewed and updated as appropriate: current medications, past family history, past medical history, past social history, past surgical history and problem list.    Review of Systems   Cardiovascular: Negative for chest pain.   Gastrointestinal: Negative for abdominal distention and abdominal pain.   Skin: Negative for color change.   Neurological: Negative for tremors, speech difficulty and headaches.   Psychiatric/Behavioral: Negative for agitation and confusion.   All other systems reviewed and are negative.        Current Outpatient Medications:   •  celecoxib (CeleBREX) 200 MG capsule, Take 1 capsule by mouth Daily., Disp: 90 capsule, Rfl: 1  •  fluticasone (FLONASE) 50 MCG/ACT nasal spray, 2 sprays into each nostril Daily., Disp: 16 g, Rfl: 5  •  irbesartan-hydrochlorothiazide (AVALIDE) 150-12.5 MG tablet, Take 1 tablet by mouth Daily., Disp: 90 tablet, Rfl: 0  •  modafinil (PROVIGIL) 200 MG tablet, Take 1 tablet by mouth Daily., Disp: 30 tablet, Rfl: 2  •  venlafaxine XR (EFFEXOR-XR) 75 MG 24 hr capsule, Take 1 capsule by mouth Daily., Disp: 30 capsule, Rfl: 4      Objective     /82   Temp 97.6 °F (36.4 °C)   Ht 170.2 cm (67\")   Wt 123 kg (271 lb 12.8 oz)   BMI 42.57 kg/m²     Physical Exam "   Constitutional: He is oriented to person, place, and time. He appears well-developed and well-nourished.   HENT:   Head: Normocephalic.   Right Ear: Tympanic membrane and ear canal normal.   Left Ear: Tympanic membrane and ear canal normal.   Mouth/Throat: Oropharynx is clear and moist.   Eyes: Conjunctivae, EOM and lids are normal. Pupils are equal, round, and reactive to light.   Neck: Normal range of motion. Neck supple. No thyromegaly present.   Cardiovascular: Normal rate, regular rhythm and normal heart sounds.   Pulses:       Carotid pulses are 2+ on the right side, and 2+ on the left side.       Femoral pulses are 2+ on the right side, and 2+ on the left side.  Pulmonary/Chest: Effort normal and breath sounds normal.   Abdominal: Soft. Normal appearance and bowel sounds are normal. He exhibits no distension. There is no hepatosplenomegaly. There is no tenderness. Hernia confirmed negative in the right inguinal area and confirmed negative in the left inguinal area.   Musculoskeletal: Normal range of motion. He exhibits no edema or tenderness.   Lymphadenopathy:     He has no cervical adenopathy. No inguinal adenopathy noted on the right or left side.   Neurological: He is alert and oriented to person, place, and time.   Skin: Skin is warm and dry.   Psychiatric: He has a normal mood and affect. His behavior is normal.   Nursing note and vitals reviewed.      Reviewed the following with the patient: Discussion today with patient regarding current guidelines for timing/ frequency of colonoscopy and lab tests (including PSA).     Immunizations discussed today with current recommendations advised for DTaP, Zostavax, Pneumovax and Prevnar 13.    Appropriate diet and stretching discussed with handouts provided.        Assessment/Plan   Lexa was seen today for annual exam.    Diagnoses and all orders for this visit:    Annual physical exam  -     CBC & Differential  -     Comprehensive Metabolic Panel  -      Lipid Panel  -     Vitamin B12  -     TSH  -     Vitamin D 25 Hydroxy    Essential hypertension  -     irbesartan-hydrochlorothiazide (AVALIDE) 150-12.5 MG tablet; Take 1 tablet by mouth Daily.    Depression with anxiety  -     venlafaxine XR (EFFEXOR-XR) 75 MG 24 hr capsule; Take 1 capsule by mouth Daily.    Fatigue, unspecified type  -     modafinil (PROVIGIL) 200 MG tablet; Take 1 tablet by mouth Daily.    Arthritis  -     celecoxib (CeleBREX) 200 MG capsule; Take 1 capsule by mouth Daily.        1. CPE- screening labs today. ABC's of mole surveillance instructed.    2. CV- HTN- BP at goal. RF irbesartan HCT x6mo today  3. PSYCH- mood at goal. RF effexor and provigil x6mo today  4. RECHECK- 6mo Dr Gonsalez

## 2019-04-16 ENCOUNTER — TELEPHONE (OUTPATIENT)
Dept: INTERNAL MEDICINE | Facility: CLINIC | Age: 34
End: 2019-04-16

## 2019-04-16 DIAGNOSIS — M19.90 ARTHRITIS: ICD-10-CM

## 2019-04-16 DIAGNOSIS — F41.8 DEPRESSION WITH ANXIETY: ICD-10-CM

## 2019-04-16 DIAGNOSIS — I10 ESSENTIAL HYPERTENSION: ICD-10-CM

## 2019-04-16 RX ORDER — CELECOXIB 200 MG/1
200 CAPSULE ORAL DAILY
Qty: 30 CAPSULE | Refills: 0 | Status: SHIPPED | OUTPATIENT
Start: 2019-04-16 | End: 2019-10-10 | Stop reason: SDUPTHER

## 2019-04-16 RX ORDER — IRBESARTAN AND HYDROCHLOROTHIAZIDE 150; 12.5 MG/1; MG/1
1 TABLET, FILM COATED ORAL DAILY
Qty: 30 TABLET | Refills: 0 | Status: SHIPPED | OUTPATIENT
Start: 2019-04-16 | End: 2019-08-02 | Stop reason: SDUPTHER

## 2019-04-16 RX ORDER — VENLAFAXINE HYDROCHLORIDE 75 MG/1
75 CAPSULE, EXTENDED RELEASE ORAL DAILY
Qty: 30 CAPSULE | Refills: 0 | Status: SHIPPED | OUTPATIENT
Start: 2019-04-16 | End: 2019-09-27 | Stop reason: SDUPTHER

## 2019-04-16 NOTE — TELEPHONE ENCOUNTER
Pt advised that I can refill everything except the controlled prescription. Pt also advised that insurance may or may not cover the medications as they will be filled too early.

## 2019-04-16 NOTE — TELEPHONE ENCOUNTER
PT STATES HE WENT OUT OF TOWN AND FORGOT TO BRING HIS EFFEXOR, MODAFINIL, AVALIDE, AND CELEBREX. HE WANTS TO KNOW IF HE CAN GET THEM SENT INTO [facility]  [address],[place] [facility] [address]  HE SAID THEY ARE A WEEK EARLY TO BE FILLED.

## 2019-05-24 ENCOUNTER — OFFICE VISIT (OUTPATIENT)
Dept: INTERNAL MEDICINE | Facility: CLINIC | Age: 34
End: 2019-05-24

## 2019-05-24 VITALS
HEIGHT: 67 IN | WEIGHT: 271.8 LBS | TEMPERATURE: 97.4 F | BODY MASS INDEX: 42.66 KG/M2 | DIASTOLIC BLOOD PRESSURE: 80 MMHG | SYSTOLIC BLOOD PRESSURE: 132 MMHG

## 2019-05-24 DIAGNOSIS — E78.2 MIXED HYPERLIPIDEMIA: Primary | ICD-10-CM

## 2019-05-24 DIAGNOSIS — R79.89 ELEVATED LFTS: ICD-10-CM

## 2019-05-24 PROCEDURE — 99213 OFFICE O/P EST LOW 20 MIN: CPT | Performed by: INTERNAL MEDICINE

## 2019-05-24 NOTE — PROGRESS NOTES
"Subjective   Lexa Conn is a 34 y.o. male here for follow-up abnormal labs. He has never had cholesterol checked but last check was elevated. Some fam hx. He also had elevated LFTs, never had that before either. Denies significant EtOH intake, no excessive acetaminophen usage. He travels for work frequently and has to eat out most of the time. He has been trying to eat better but hasn't lost any weight.     The following portions of the patient's history were reviewed and updated as appropriate: allergies, current medications, past medical history, past social history and problem list.    Review of Systems:  General: negative  CV: negative  Respiratory: negative  Skin: negative    Objective   /80 (BP Location: Left arm, Patient Position: Sitting, Cuff Size: Large Adult)   Temp 97.4 °F (36.3 °C) (Temporal)   Ht 170.2 cm (67\")   Wt 123 kg (271 lb 12.8 oz)   BMI 42.57 kg/m²     Physical Exam   Constitutional: He is oriented to person, place, and time. He appears well-developed and well-nourished.   Pulmonary/Chest: Effort normal.   Neurological: He is alert and oriented to person, place, and time.   Skin: Skin is warm and dry.   Psychiatric: He has a normal mood and affect. His behavior is normal. Judgment and thought content normal.   Vitals reviewed.      Assessment/Plan   Lexa was seen today for follow-up.    Diagnoses and all orders for this visit:    Mixed hyperlipidemia  -, also HTN  -given there is no other cholesterol in the chart and he was not fasting, will have pt repeat this to see fasting numbers. May likely need statin therapy. Really stressed diet, see below    Elevated LFTs  -discussed likely fatty liver given obesity. rec serious dietary changes, exercise can come later just really focus on eating a low calorie diet  -may need US to confirm         "

## 2019-06-06 ENCOUNTER — LAB (OUTPATIENT)
Dept: INTERNAL MEDICINE | Facility: CLINIC | Age: 34
End: 2019-06-06

## 2019-06-06 DIAGNOSIS — E78.2 MIXED HYPERLIPIDEMIA: ICD-10-CM

## 2019-06-06 DIAGNOSIS — R79.89 ELEVATED LFTS: ICD-10-CM

## 2019-06-06 LAB
ALBUMIN SERPL-MCNC: 4.3 G/DL (ref 3.5–5.2)
ALBUMIN/GLOB SERPL: 1.5 G/DL
ALP SERPL-CCNC: 74 U/L (ref 39–117)
ALT SERPL W P-5'-P-CCNC: 44 U/L (ref 1–41)
ANION GAP SERPL CALCULATED.3IONS-SCNC: 15.9 MMOL/L
AST SERPL-CCNC: 26 U/L (ref 1–40)
BILIRUB SERPL-MCNC: 0.5 MG/DL (ref 0.2–1.2)
BUN BLD-MCNC: 18 MG/DL (ref 6–20)
BUN/CREAT SERPL: 18.8 (ref 7–25)
CALCIUM SPEC-SCNC: 9.6 MG/DL (ref 8.6–10.5)
CHLORIDE SERPL-SCNC: 100 MMOL/L (ref 98–107)
CHOLEST SERPL-MCNC: 192 MG/DL (ref 0–200)
CO2 SERPL-SCNC: 23.1 MMOL/L (ref 22–29)
CREAT BLD-MCNC: 0.96 MG/DL (ref 0.76–1.27)
GFR SERPL CREATININE-BSD FRML MDRD: 90 ML/MIN/1.73
GLOBULIN UR ELPH-MCNC: 2.8 GM/DL
GLUCOSE BLD-MCNC: 78 MG/DL (ref 65–99)
HDLC SERPL-MCNC: 41 MG/DL (ref 40–60)
LDLC SERPL CALC-MCNC: 125 MG/DL (ref 0–100)
LDLC/HDLC SERPL: 3.06 {RATIO}
POTASSIUM BLD-SCNC: 4.3 MMOL/L (ref 3.5–5.2)
PROT SERPL-MCNC: 7.1 G/DL (ref 6–8.5)
SODIUM BLD-SCNC: 139 MMOL/L (ref 136–145)
TRIGL SERPL-MCNC: 128 MG/DL (ref 0–150)
VLDLC SERPL-MCNC: 25.6 MG/DL (ref 5–40)

## 2019-06-06 PROCEDURE — 80061 LIPID PANEL: CPT | Performed by: INTERNAL MEDICINE

## 2019-06-06 PROCEDURE — 80053 COMPREHEN METABOLIC PANEL: CPT | Performed by: INTERNAL MEDICINE

## 2019-08-02 DIAGNOSIS — I10 ESSENTIAL HYPERTENSION: ICD-10-CM

## 2019-08-02 DIAGNOSIS — R53.83 FATIGUE, UNSPECIFIED TYPE: ICD-10-CM

## 2019-08-02 RX ORDER — MODAFINIL 200 MG/1
200 TABLET ORAL DAILY
Qty: 30 TABLET | Refills: 2 | Status: SHIPPED | OUTPATIENT
Start: 2019-08-02 | End: 2019-10-25 | Stop reason: SDUPTHER

## 2019-08-02 RX ORDER — IRBESARTAN AND HYDROCHLOROTHIAZIDE 150; 12.5 MG/1; MG/1
1 TABLET, FILM COATED ORAL DAILY
Qty: 30 TABLET | Refills: 0 | Status: SHIPPED | OUTPATIENT
Start: 2019-08-02 | End: 2019-08-29 | Stop reason: SDUPTHER

## 2019-08-29 DIAGNOSIS — I10 ESSENTIAL HYPERTENSION: ICD-10-CM

## 2019-08-29 RX ORDER — IRBESARTAN AND HYDROCHLOROTHIAZIDE 150; 12.5 MG/1; MG/1
TABLET, FILM COATED ORAL
Qty: 30 TABLET | Refills: 0 | Status: SHIPPED | OUTPATIENT
Start: 2019-08-29 | End: 2019-09-27 | Stop reason: SDUPTHER

## 2019-09-27 DIAGNOSIS — I10 ESSENTIAL HYPERTENSION: ICD-10-CM

## 2019-09-27 DIAGNOSIS — F41.8 DEPRESSION WITH ANXIETY: ICD-10-CM

## 2019-09-27 RX ORDER — VENLAFAXINE HYDROCHLORIDE 75 MG/1
75 CAPSULE, EXTENDED RELEASE ORAL DAILY
Qty: 30 CAPSULE | Refills: 0 | Status: SHIPPED | OUTPATIENT
Start: 2019-09-27 | End: 2019-10-10 | Stop reason: SDUPTHER

## 2019-09-27 RX ORDER — IRBESARTAN AND HYDROCHLOROTHIAZIDE 150; 12.5 MG/1; MG/1
TABLET, FILM COATED ORAL
Qty: 30 TABLET | Refills: 0 | Status: SHIPPED | OUTPATIENT
Start: 2019-09-27 | End: 2019-10-10 | Stop reason: SDUPTHER

## 2019-10-10 ENCOUNTER — OFFICE VISIT (OUTPATIENT)
Dept: INTERNAL MEDICINE | Facility: CLINIC | Age: 34
End: 2019-10-10

## 2019-10-10 VITALS
HEIGHT: 67 IN | WEIGHT: 265 LBS | DIASTOLIC BLOOD PRESSURE: 80 MMHG | BODY MASS INDEX: 41.59 KG/M2 | TEMPERATURE: 97.4 F | SYSTOLIC BLOOD PRESSURE: 142 MMHG

## 2019-10-10 DIAGNOSIS — I10 ESSENTIAL HYPERTENSION: Primary | ICD-10-CM

## 2019-10-10 DIAGNOSIS — M10.9 ACUTE GOUT INVOLVING TOE OF RIGHT FOOT, UNSPECIFIED CAUSE: ICD-10-CM

## 2019-10-10 DIAGNOSIS — F41.8 DEPRESSION WITH ANXIETY: ICD-10-CM

## 2019-10-10 DIAGNOSIS — M19.90 ARTHRITIS: ICD-10-CM

## 2019-10-10 PROCEDURE — 99214 OFFICE O/P EST MOD 30 MIN: CPT | Performed by: INTERNAL MEDICINE

## 2019-10-10 RX ORDER — VENLAFAXINE HYDROCHLORIDE 75 MG/1
75 CAPSULE, EXTENDED RELEASE ORAL DAILY
Qty: 90 CAPSULE | Refills: 2 | Status: SHIPPED | OUTPATIENT
Start: 2019-10-10 | End: 2020-03-31 | Stop reason: SDUPTHER

## 2019-10-10 RX ORDER — CELECOXIB 200 MG/1
200 CAPSULE ORAL DAILY
Qty: 90 CAPSULE | Refills: 2 | Status: SHIPPED | OUTPATIENT
Start: 2019-10-10 | End: 2020-03-31 | Stop reason: SDUPTHER

## 2019-10-10 RX ORDER — IRBESARTAN AND HYDROCHLOROTHIAZIDE 150; 12.5 MG/1; MG/1
1 TABLET, FILM COATED ORAL DAILY
Qty: 90 TABLET | Refills: 2 | Status: SHIPPED | OUTPATIENT
Start: 2019-10-10 | End: 2019-12-07 | Stop reason: SDUPTHER

## 2019-10-10 NOTE — PROGRESS NOTES
"Subjective   Lexa Conn is a 34 y.o. male here for follow-up HTN, arthritis, A&D, and new toe pain. HTN: no issues, surprised BP was high today. Did take med this am. He is in a bit of pain on his right great toe. It feel warm, red, and swollen. He went to [facility] and got indomethacin, took for 2-3 d and stopped it. The pain is improving slightly but still there. Hurt to walk initially. He has had this before but didn't last this long. No dx gout. He has been eating more protein lately, red meat. A&D: feels mood is stable, ok with the regimen he is on now. He takes celebrex daily for aches and pains in major joints as his job is active. Works well.       I have reviewed the following portions of the patient's history and confirmed they are accurate: current medications, past medical history, past social history and problem list     I have personally completed the patient's review of systems.    Review of Systems:  General: negative  CV: negative  Neuro: negative  Psych: dysphoric mood  MSK: see hpi  Skin: see hpi    Objective   /80 (BP Location: Left arm, Patient Position: Sitting, Cuff Size: Large Adult)   Temp 97.4 °F (36.3 °C) (Temporal)   Ht 170.2 cm (67\")   Wt 120 kg (265 lb)   BMI 41.50 kg/m²     Physical Exam   Constitutional: He is oriented to person, place, and time. He appears well-developed and well-nourished.   Cardiovascular: Normal rate, regular rhythm and normal heart sounds.   Pulmonary/Chest: Effort normal and breath sounds normal. He has no wheezes. He has no rales.   Musculoskeletal:   Right great toe erythematous at the base of the toe with slight edema. Normal ROM.   Neurological: He is alert and oriented to person, place, and time.   Skin: Skin is warm and dry.   Psychiatric: He has a normal mood and affect. His behavior is normal. Thought content normal.   Vitals reviewed.      Assessment/Plan   Lexa was seen today for anxiety, depression and hypertension.    Diagnoses and all " orders for this visit:    Essential hypertension  -     irbesartan-hydrochlorothiazide (AVALIDE) 150-12.5 MG tablet; Take 1 tablet by mouth Daily.  -elevated today, last measurement was at goal. Told him to take BP at home and keep an eye on it. If systolic continues to be 140 will have to make med adjustment    Arthritis  -     celecoxib (CeleBREX) 200 MG capsule; Take 1 capsule by mouth Daily. Told him don't take this concurrently with indomethacin or ibu  -chronic controlled    Depression with anxiety  -     venlafaxine XR (EFFEXOR-XR) 75 MG 24 hr capsule; Take 1 capsule by mouth Daily.  -chronic controlled    Acute gout involving toe of right foot, unspecified cause  -discussed this with pt. Has had this a few times, very indicative of gout. rec he continue the indomethacin and when he finishes that start ibu 800mg for 2-3d as he has been intermittently taking the indomethacin and still having sx  -new problem           Inga Nicholas, MA

## 2019-10-24 DIAGNOSIS — R53.83 FATIGUE, UNSPECIFIED TYPE: ICD-10-CM

## 2019-10-25 DIAGNOSIS — R53.83 FATIGUE, UNSPECIFIED TYPE: ICD-10-CM

## 2019-10-25 RX ORDER — MODAFINIL 200 MG/1
TABLET ORAL
Qty: 30 TABLET | Refills: 1 | Status: CANCELLED | OUTPATIENT
Start: 2019-10-25

## 2019-10-25 RX ORDER — MODAFINIL 200 MG/1
200 TABLET ORAL DAILY
Qty: 30 TABLET | Refills: 2 | Status: SHIPPED | OUTPATIENT
Start: 2019-10-25 | End: 2020-02-07 | Stop reason: SDUPTHER

## 2019-12-07 DIAGNOSIS — I10 ESSENTIAL HYPERTENSION: ICD-10-CM

## 2019-12-09 RX ORDER — IRBESARTAN AND HYDROCHLOROTHIAZIDE 150; 12.5 MG/1; MG/1
1 TABLET, FILM COATED ORAL DAILY
Qty: 90 TABLET | Refills: 2 | Status: SHIPPED | OUTPATIENT
Start: 2019-12-09 | End: 2020-03-31 | Stop reason: SDUPTHER

## 2020-02-07 DIAGNOSIS — R53.83 FATIGUE, UNSPECIFIED TYPE: ICD-10-CM

## 2020-02-10 RX ORDER — MODAFINIL 200 MG/1
200 TABLET ORAL DAILY
Qty: 30 TABLET | Refills: 2 | Status: SHIPPED | OUTPATIENT
Start: 2020-02-10 | End: 2020-03-31 | Stop reason: SDUPTHER

## 2020-03-31 ENCOUNTER — TELEPHONE (OUTPATIENT)
Dept: INTERNAL MEDICINE | Facility: CLINIC | Age: 35
End: 2020-03-31

## 2020-03-31 DIAGNOSIS — F41.8 DEPRESSION WITH ANXIETY: ICD-10-CM

## 2020-03-31 DIAGNOSIS — M19.90 ARTHRITIS: ICD-10-CM

## 2020-03-31 DIAGNOSIS — I10 ESSENTIAL HYPERTENSION: ICD-10-CM

## 2020-03-31 DIAGNOSIS — J30.9 CHRONIC ALLERGIC RHINITIS: ICD-10-CM

## 2020-03-31 DIAGNOSIS — R53.83 FATIGUE, UNSPECIFIED TYPE: ICD-10-CM

## 2020-03-31 RX ORDER — CELECOXIB 200 MG/1
200 CAPSULE ORAL DAILY
Qty: 90 CAPSULE | Refills: 2 | Status: SHIPPED | OUTPATIENT
Start: 2020-03-31 | End: 2021-01-20

## 2020-03-31 RX ORDER — VENLAFAXINE HYDROCHLORIDE 75 MG/1
75 CAPSULE, EXTENDED RELEASE ORAL DAILY
Qty: 90 CAPSULE | Refills: 2 | Status: SHIPPED | OUTPATIENT
Start: 2020-03-31 | End: 2020-06-26

## 2020-03-31 RX ORDER — FLUTICASONE PROPIONATE 50 MCG
2 SPRAY, SUSPENSION (ML) NASAL DAILY
Qty: 16 G | Refills: 5 | Status: SHIPPED | OUTPATIENT
Start: 2020-03-31 | End: 2022-09-28

## 2020-03-31 RX ORDER — IRBESARTAN AND HYDROCHLOROTHIAZIDE 150; 12.5 MG/1; MG/1
1 TABLET, FILM COATED ORAL DAILY
Qty: 90 TABLET | Refills: 2 | Status: SHIPPED | OUTPATIENT
Start: 2020-03-31 | End: 2021-02-03 | Stop reason: SDUPTHER

## 2020-03-31 RX ORDER — MODAFINIL 200 MG/1
200 TABLET ORAL DAILY
Qty: 30 TABLET | Refills: 2 | Status: SHIPPED | OUTPATIENT
Start: 2020-03-31 | End: 2020-06-26 | Stop reason: SDUPTHER

## 2020-06-26 ENCOUNTER — OFFICE VISIT (OUTPATIENT)
Dept: INTERNAL MEDICINE | Facility: CLINIC | Age: 35
End: 2020-06-26

## 2020-06-26 VITALS
WEIGHT: 234.2 LBS | HEIGHT: 67 IN | SYSTOLIC BLOOD PRESSURE: 136 MMHG | DIASTOLIC BLOOD PRESSURE: 82 MMHG | BODY MASS INDEX: 36.76 KG/M2 | TEMPERATURE: 97.1 F

## 2020-06-26 DIAGNOSIS — F41.9 ANXIETY AND DEPRESSION: ICD-10-CM

## 2020-06-26 DIAGNOSIS — I10 ESSENTIAL HYPERTENSION: Primary | ICD-10-CM

## 2020-06-26 DIAGNOSIS — R53.83 FATIGUE, UNSPECIFIED TYPE: ICD-10-CM

## 2020-06-26 DIAGNOSIS — F32.A ANXIETY AND DEPRESSION: ICD-10-CM

## 2020-06-26 PROCEDURE — 99214 OFFICE O/P EST MOD 30 MIN: CPT | Performed by: INTERNAL MEDICINE

## 2020-06-26 RX ORDER — ESCITALOPRAM OXALATE 10 MG/1
10 TABLET ORAL DAILY
Qty: 30 TABLET | Refills: 5 | Status: SHIPPED | OUTPATIENT
Start: 2020-06-26 | End: 2021-01-04

## 2020-06-26 RX ORDER — MODAFINIL 200 MG/1
400 TABLET ORAL DAILY
Qty: 60 TABLET | Refills: 2 | Status: SHIPPED | OUTPATIENT
Start: 2020-06-26 | End: 2022-09-28

## 2020-06-26 NOTE — PROGRESS NOTES
"Subjective   Lexa Cnon is a 35 y.o. male here for follow-up HTN, ADD, A&D. HTN: no issues, admits to sometimes forgetting BP med but overall he does take it. Doesn't take readings at home. A&D: both a bit worse lately. He is filing paperwork to get a divorce from his wife and is stressed about that. He is the one that initiated it and isn't sure it's right, but is going to proceed. He has felt a bit depressed and anxious. He feels attention has really gotten worse lately. It is starting to affect his work. He is in construction, mostly industrial. Feels he can't concentrate, difficulty completing tasks. He felt the provigil helps a little but not enough right now. Definitely worse since the separation. Doesn't feel the effexor is working. Never really worked well.       I have reviewed the following portions of the patient's history and confirmed they are accurate: current medications, past medical history, past social history and problem list     I have personally completed the patient's review of systems.    Review of Systems:  General: negative  CV: negative  Respiratory: negative  Neuro: negative  Psych: A&D, inattention    Objective   /82 (BP Location: Left arm, Patient Position: Sitting, Cuff Size: Large Adult)   Temp 97.1 °F (36.2 °C) (Temporal)   Ht 170.2 cm (67\")   Wt 106 kg (234 lb 3.2 oz)   BMI 36.68 kg/m²     Physical Exam   Constitutional: He is oriented to person, place, and time. He appears well-developed and well-nourished.   HENT:   Head: Normocephalic and atraumatic.   Eyes: Conjunctivae are normal.   Pulmonary/Chest: Effort normal.   Neurological: He is alert and oriented to person, place, and time.   Skin: Skin is warm and dry.   Psychiatric: He has a normal mood and affect. His behavior is normal. Judgment and thought content normal.   Vitals reviewed.      Assessment/Plan   Lexa was seen today for hypertension, depression and anxiety.    Diagnoses and all orders for this " visit:    Essential hypertension  -better control today, continue current meds    Fatigue, unspecified type  -     modafinil (Provigil) 200 MG tablet; Take 2 tablets by mouth Daily.  -increase to 400mg    Anxiety and depression  -     escitalopram (Lexapro) 10 MG tablet; Take 1 tablet by mouth Daily.  -continue effexor for 1 week concurrently with lexapro then stop effexor and transition to lexapro alone             Inga Nicholas MA    Please note that portions of this note were completed with a voice recognition program. Efforts were made to edit the dictations, but occasionally words are mistranscribed.  Answers for HPI/ROS submitted by the patient on 6/19/2020   What is the primary reason for your visit?: Other  Please describe your symptoms.: It seems some of my medications might not be working anymore.  Have you had these symptoms before?: Yes  How long have you been having these symptoms?: Greater than 2 weeks  Please list any medications you are currently taking for this condition.: Effexor and Provigil

## 2020-07-02 ENCOUNTER — TELEPHONE (OUTPATIENT)
Dept: INTERNAL MEDICINE | Facility: CLINIC | Age: 35
End: 2020-07-02

## 2020-07-02 DIAGNOSIS — F98.8 ATTENTION DEFICIT DISORDER, UNSPECIFIED HYPERACTIVITY PRESENCE: Primary | ICD-10-CM

## 2020-07-02 NOTE — TELEPHONE ENCOUNTER
Ok. Tell him if he thinks he would like something stronger than the provigil for ADD then I want him to see psych. He could try [facility]. I don't initiate adderall/vyvanse.

## 2020-07-06 ENCOUNTER — TELEPHONE (OUTPATIENT)
Dept: INTERNAL MEDICINE | Facility: CLINIC | Age: 35
End: 2020-07-06

## 2020-08-07 RX ORDER — BUPROPION HYDROCHLORIDE 150 MG/1
150 TABLET ORAL DAILY
Qty: 30 TABLET | Refills: 2 | Status: SHIPPED | OUTPATIENT
Start: 2020-08-07 | End: 2020-11-02 | Stop reason: SDUPTHER

## 2020-08-07 RX ORDER — ATOMOXETINE 40 MG/1
40 CAPSULE ORAL DAILY
Qty: 30 CAPSULE | Refills: 1 | Status: SHIPPED | OUTPATIENT
Start: 2020-08-07 | End: 2020-10-06 | Stop reason: SDUPTHER

## 2020-10-06 RX ORDER — ATOMOXETINE 40 MG/1
40 CAPSULE ORAL DAILY
Qty: 30 CAPSULE | Refills: 4 | Status: SHIPPED | OUTPATIENT
Start: 2020-10-06 | End: 2022-09-28

## 2020-10-07 DIAGNOSIS — F98.8 ATTENTION DEFICIT DISORDER, UNSPECIFIED HYPERACTIVITY PRESENCE: Primary | ICD-10-CM

## 2020-11-02 RX ORDER — BUPROPION HYDROCHLORIDE 150 MG/1
150 TABLET ORAL DAILY
Qty: 30 TABLET | Refills: 2 | Status: SHIPPED | OUTPATIENT
Start: 2020-11-02 | End: 2021-02-03 | Stop reason: SDUPTHER

## 2021-01-01 DIAGNOSIS — F41.9 ANXIETY AND DEPRESSION: ICD-10-CM

## 2021-01-01 DIAGNOSIS — F32.A ANXIETY AND DEPRESSION: ICD-10-CM

## 2021-01-04 RX ORDER — ESCITALOPRAM OXALATE 10 MG/1
10 TABLET ORAL DAILY
Qty: 30 TABLET | Refills: 5 | Status: SHIPPED | OUTPATIENT
Start: 2021-01-04 | End: 2021-02-03 | Stop reason: SDUPTHER

## 2021-01-20 DIAGNOSIS — M19.90 ARTHRITIS: ICD-10-CM

## 2021-01-20 RX ORDER — CELECOXIB 200 MG/1
200 CAPSULE ORAL DAILY
Qty: 90 CAPSULE | Refills: 3 | Status: SHIPPED | OUTPATIENT
Start: 2021-01-20 | End: 2021-02-03 | Stop reason: SDUPTHER

## 2021-02-03 DIAGNOSIS — F32.A ANXIETY AND DEPRESSION: ICD-10-CM

## 2021-02-03 DIAGNOSIS — I10 ESSENTIAL HYPERTENSION: ICD-10-CM

## 2021-02-03 DIAGNOSIS — F41.9 ANXIETY AND DEPRESSION: ICD-10-CM

## 2021-02-03 DIAGNOSIS — M19.90 ARTHRITIS: ICD-10-CM

## 2021-02-04 RX ORDER — BUPROPION HYDROCHLORIDE 150 MG/1
150 TABLET ORAL DAILY
Qty: 30 TABLET | Refills: 5 | Status: SHIPPED | OUTPATIENT
Start: 2021-02-04 | End: 2022-05-23

## 2021-02-04 RX ORDER — ESCITALOPRAM OXALATE 10 MG/1
10 TABLET ORAL DAILY
Qty: 30 TABLET | Refills: 5 | Status: SHIPPED | OUTPATIENT
Start: 2021-02-04 | End: 2022-09-28

## 2021-02-04 RX ORDER — CELECOXIB 200 MG/1
200 CAPSULE ORAL DAILY
Qty: 90 CAPSULE | Refills: 3 | Status: SHIPPED | OUTPATIENT
Start: 2021-02-04 | End: 2022-05-23

## 2021-02-04 RX ORDER — IRBESARTAN AND HYDROCHLOROTHIAZIDE 150; 12.5 MG/1; MG/1
1 TABLET, FILM COATED ORAL DAILY
Qty: 90 TABLET | Refills: 3 | Status: SHIPPED | OUTPATIENT
Start: 2021-02-04 | End: 2021-05-23 | Stop reason: SDUPTHER

## 2021-05-23 DIAGNOSIS — I10 ESSENTIAL HYPERTENSION: ICD-10-CM

## 2021-05-24 RX ORDER — IRBESARTAN AND HYDROCHLOROTHIAZIDE 150; 12.5 MG/1; MG/1
1 TABLET, FILM COATED ORAL DAILY
Qty: 90 TABLET | Refills: 3 | Status: SHIPPED | OUTPATIENT
Start: 2021-05-24 | End: 2022-09-28 | Stop reason: SDUPTHER

## 2022-01-24 NOTE — PROGRESS NOTES
Subjective   Lexa Conn is a 33 y.o. male.     History of Present Illness   Here for 6mo routine. Last seen 10/26/17 for 1mo recheck BP. Last seen 9/22/17 for  recheck BP. Was seen 8/24/17 for recheck mood. Was seen 7/3/17 for CPE. Pt was seen 4/21/17 as a new patient. Lab 7/3/17 demo normal CBC, TSH and B12. Had vit D 9.7; advised 5k. Had CMP with glu 67, calcium 11 with recheck pending.     1.PSYCH- depression with anxiety, diagnosed at initial visit 4/21/17. At that time pt reported 1yr of symptoms of sad and anxious mood with anhedonia, feeling overwhelmed, fatigue, w/d, decreased motivation, crying, memory and concentration problems, feeling helpless and hopeless. No SI. Pt was started on Cymbalta and felt a little better but only reached 30% at 1mo and was still very tired. Was changed to Effexor 75 and reached goal by visit 8/24/17. Today he reports he does not feel at goal. Is still feeling sluggish with decreased motivation and irritability. Is gaining weight. He is ok when he wakes up but then gets tired when he is driving. Does snore, no known apnea.     2. CV-  HTN. Pt had elevated -143 over 88-97. Discussion re BP treatment vs weight loss as he had recently gained 40 lb. Was started on Topamax, called to change to trokendi 100 but did not respond and was changed to Lisinopril HCT with good results. Today he reports he is getting a dry cough from the lisinopril.      The following portions of the patient's history were reviewed and updated as appropriate: allergies, past family history, past medical history, past social history, past surgical history and problem list.    Review of Systems   Cardiovascular: Negative for chest pain.   Gastrointestinal: Negative for abdominal distention and abdominal pain.   Skin: Negative for color change.   Neurological: Negative for tremors, speech difficulty and headaches.   Psychiatric/Behavioral: Negative for agitation and confusion.   All other systems  electronic "reviewed and are negative.        Current Outpatient Prescriptions:   •  ARIPiprazole (ABILIFY) 5 MG tablet, Take 1 tab po qhs, Disp: 30 tablet, Rfl: 0  •  valsartan-hydrochlorothiazide (DIOVAN HCT) 160-25 MG per tablet, Take 1 tablet by mouth Daily., Disp: 30 tablet, Rfl: 0  •  venlafaxine XR (EFFEXOR-XR) 75 MG 24 hr capsule, 1 tab po qd, Disp: 30 capsule, Rfl: 5    Objective     /84 (BP Location: Left arm, Patient Position: Sitting)   Ht 170.2 cm (67.01\")   Wt 115 kg (253 lb)   BMI 39.62 kg/m²     Physical Exam   Constitutional: He is oriented to person, place, and time. He appears well-developed and well-nourished.   HENT:   Right Ear: Tympanic membrane and ear canal normal.   Left Ear: Tympanic membrane and ear canal normal.   Mouth/Throat: Oropharynx is clear and moist.   Eyes: Conjunctivae and EOM are normal. Pupils are equal, round, and reactive to light.   Neck: No thyromegaly present.   Cardiovascular: Normal rate and regular rhythm.    Pulmonary/Chest: Effort normal and breath sounds normal.   Neurological: He is alert and oriented to person, place, and time.   Skin: Skin is warm and dry.   Psychiatric: He has a normal mood and affect.   Vitals reviewed.      Assessment/Plan   Lexa was seen today for hypertension.    Diagnoses and all orders for this visit:    Essential hypertension  -     valsartan-hydrochlorothiazide (DIOVAN HCT) 160-25 MG per tablet; Take 1 tablet by mouth Daily.  -     ARIPiprazole (ABILIFY) 5 MG tablet; Take 1 tab po qhs    Depression with anxiety  -     venlafaxine XR (EFFEXOR-XR) 75 MG 24 hr capsule; 1 tab po qd      1. CV- HTN- BP still slightly elevated and pt getting an ACEI cough. Will change the lisinopril HCT to diovan HCT.  2. PSYCH- depression with anxiety- will continue the effexor and add low dose abilify. Pt will check with his girlfriend if he is stopping breathing during his sleep.  3. RECHECK- 1mo         "

## 2022-05-22 DIAGNOSIS — M19.90 ARTHRITIS: ICD-10-CM

## 2022-05-23 RX ORDER — BUPROPION HYDROCHLORIDE 150 MG/1
150 TABLET ORAL DAILY
Qty: 30 TABLET | Refills: 5 | Status: SHIPPED | OUTPATIENT
Start: 2022-05-23 | End: 2022-09-28 | Stop reason: SDUPTHER

## 2022-05-23 RX ORDER — CELECOXIB 200 MG/1
200 CAPSULE ORAL DAILY
Qty: 90 CAPSULE | Refills: 3 | Status: SHIPPED | OUTPATIENT
Start: 2022-05-23

## 2022-09-28 ENCOUNTER — OFFICE VISIT (OUTPATIENT)
Dept: INTERNAL MEDICINE | Facility: CLINIC | Age: 37
End: 2022-09-28

## 2022-09-28 ENCOUNTER — LAB (OUTPATIENT)
Dept: LAB | Facility: HOSPITAL | Age: 37
End: 2022-09-28

## 2022-09-28 VITALS
OXYGEN SATURATION: 98 % | WEIGHT: 237.4 LBS | HEART RATE: 88 BPM | SYSTOLIC BLOOD PRESSURE: 130 MMHG | HEIGHT: 66 IN | TEMPERATURE: 97.5 F | BODY MASS INDEX: 38.15 KG/M2 | DIASTOLIC BLOOD PRESSURE: 90 MMHG

## 2022-09-28 DIAGNOSIS — I10 ESSENTIAL HYPERTENSION: ICD-10-CM

## 2022-09-28 DIAGNOSIS — F41.8 DEPRESSION WITH ANXIETY: ICD-10-CM

## 2022-09-28 DIAGNOSIS — Z00.00 HEALTH CARE MAINTENANCE: Primary | ICD-10-CM

## 2022-09-28 PROBLEM — R53.83 FATIGUE: Status: RESOLVED | Noted: 2018-12-07 | Resolved: 2022-09-28

## 2022-09-28 LAB
DEPRECATED RDW RBC AUTO: 43.8 FL (ref 37–54)
ERYTHROCYTE [DISTWIDTH] IN BLOOD BY AUTOMATED COUNT: 13.6 % (ref 12.3–15.4)
HCT VFR BLD AUTO: 47.4 % (ref 37.5–51)
HGB BLD-MCNC: 16.3 G/DL (ref 13–17.7)
MCH RBC QN AUTO: 30.5 PG (ref 26.6–33)
MCHC RBC AUTO-ENTMCNC: 34.4 G/DL (ref 31.5–35.7)
MCV RBC AUTO: 88.8 FL (ref 79–97)
PLATELET # BLD AUTO: 269 10*3/MM3 (ref 140–450)
PMV BLD AUTO: 10.1 FL (ref 6–12)
RBC # BLD AUTO: 5.34 10*6/MM3 (ref 4.14–5.8)
WBC NRBC COR # BLD: 5.57 10*3/MM3 (ref 3.4–10.8)

## 2022-09-28 PROCEDURE — 99395 PREV VISIT EST AGE 18-39: CPT | Performed by: INTERNAL MEDICINE

## 2022-09-28 PROCEDURE — 80061 LIPID PANEL: CPT | Performed by: INTERNAL MEDICINE

## 2022-09-28 PROCEDURE — 85027 COMPLETE CBC AUTOMATED: CPT | Performed by: INTERNAL MEDICINE

## 2022-09-28 PROCEDURE — 86803 HEPATITIS C AB TEST: CPT | Performed by: INTERNAL MEDICINE

## 2022-09-28 PROCEDURE — 80053 COMPREHEN METABOLIC PANEL: CPT | Performed by: INTERNAL MEDICINE

## 2022-09-28 RX ORDER — BUPROPION HYDROCHLORIDE 150 MG/1
150 TABLET ORAL DAILY
Qty: 90 TABLET | Refills: 3 | Status: SHIPPED | OUTPATIENT
Start: 2022-09-28

## 2022-09-28 RX ORDER — IRBESARTAN AND HYDROCHLOROTHIAZIDE 150; 12.5 MG/1; MG/1
1 TABLET, FILM COATED ORAL DAILY
Qty: 90 TABLET | Refills: 3 | Status: SHIPPED | OUTPATIENT
Start: 2022-09-28

## 2022-09-28 NOTE — PROGRESS NOTES
"Subjective   Lexa Conn is a 37 y.o. male here for yearly physical. Has been off all meds as they weren't renewed. Says BP has been \"ok\" at home off med. Mood has been so-so.    Review of Systems   Constitutional: Negative.    HENT: Negative.    Eyes: Negative.    Respiratory: Negative.    Cardiovascular: Negative.    Gastrointestinal: Negative.    Endocrine: Negative.    Genitourinary: Negative.    Musculoskeletal: Negative.    Skin: Negative.    Allergic/Immunologic: Negative.    Neurological: Negative.    Hematological: Negative.    Psychiatric/Behavioral: Positive for dysphoric mood.          History reviewed. No pertinent past medical history.  Family History   Problem Relation Age of Onset   • Arthritis Other    • Hyperlipidemia Other    • Hypertension Other      History reviewed. No pertinent surgical history.  Social History     Socioeconomic History   • Marital status:    Tobacco Use   • Smoking status: Never Smoker   • Smokeless tobacco: Never Used   Vaping Use   • Vaping Use: Never used   Substance and Sexual Activity   • Alcohol use: Yes     Comment: 0.5 drinks daily   • Drug use: No   • Sexual activity: Defer         Current Outpatient Medications:   •  buPROPion XL (WELLBUTRIN XL) 150 MG 24 hr tablet, Take 1 tablet by mouth Daily., Disp: 90 tablet, Rfl: 3  •  irbesartan-hydrochlorothiazide (AVALIDE) 150-12.5 MG tablet, Take 1 tablet by mouth Daily., Disp: 90 tablet, Rfl: 3  •  celecoxib (CeleBREX) 200 MG capsule, TAKE 1 CAPSULE BY MOUTH DAILY, Disp: 90 capsule, Rfl: 3    Objective   /90   Pulse 88   Temp 97.5 °F (36.4 °C)   Ht 167 cm (65.75\")   Wt 108 kg (237 lb 6.4 oz)   SpO2 98%   BMI 38.61 kg/m²   Physical Exam  Vitals reviewed.   Constitutional:       General: He is not in acute distress.     Appearance: He is well-developed. He is obese.   HENT:      Head: Normocephalic and atraumatic.      Right Ear: Tympanic membrane, ear canal and external ear normal.      Left Ear: " Tympanic membrane, ear canal and external ear normal.      Nose: Nose normal.      Mouth/Throat:      Lips: Pink.      Mouth: Mucous membranes are moist.      Tongue: No lesions.      Pharynx: Oropharynx is clear.   Eyes:      General: Lids are normal. Vision grossly intact. No scleral icterus.     Extraocular Movements: Extraocular movements intact.      Conjunctiva/sclera: Conjunctivae normal.      Pupils: Pupils are equal, round, and reactive to light.   Neck:      Thyroid: No thyroid mass or thyromegaly.      Vascular: No carotid bruit.   Cardiovascular:      Rate and Rhythm: Normal rate and regular rhythm.      Pulses:           Dorsalis pedis pulses are 2+ on the right side and 2+ on the left side.        Posterior tibial pulses are 2+ on the right side and 2+ on the left side.      Heart sounds: Normal heart sounds. No murmur heard.    No friction rub. No gallop. No S3 or S4 sounds.   Pulmonary:      Effort: Pulmonary effort is normal. No respiratory distress.      Breath sounds: Normal breath sounds. No wheezing, rhonchi or rales.   Abdominal:      General: Bowel sounds are normal. There is no distension.      Palpations: Abdomen is soft. There is no mass.      Tenderness: There is no abdominal tenderness.   Musculoskeletal:         General: Normal range of motion.      Cervical back: Neck supple.      Right lower leg: No edema.      Left lower leg: No edema.   Lymphadenopathy:      Cervical: No cervical adenopathy.   Skin:     General: Skin is warm and dry.      Coloration: Skin is not pale.      Findings: No erythema or rash.   Neurological:      Mental Status: He is alert and oriented to person, place, and time.      Cranial Nerves: No cranial nerve deficit.      Sensory: No sensory deficit.      Gait: Gait is intact.   Psychiatric:         Attention and Perception: Attention normal.         Mood and Affect: Mood normal.         Speech: Speech normal.         Behavior: Behavior normal.         Thought  Content: Thought content normal.         Judgment: Judgment normal.         Assessment & Plan   Diagnoses and all orders for this visit:    1. Health care maintenance (Primary)  -     CBC (No Diff)  -     Comprehensive Metabolic Panel  -     Lipid Panel  -     Hepatitis C Antibody    2. Essential hypertension  -     irbesartan-hydrochlorothiazide (AVALIDE) 150-12.5 MG tablet; Take 1 tablet by mouth Daily.  Dispense: 90 tablet; Refill: 3  -restart med    3. Depression with anxiety  -     buPROPion XL (WELLBUTRIN XL) 150 MG 24 hr tablet; Take 1 tablet by mouth Daily.  Dispense: 90 tablet; Refill: 3  -restart med        1. Health Care Maintenance  -cscope recommended due to sister with polyps (GI rec her siblings get early cscope)  -fasting labs today  -Class 2 Severe Obesity (BMI >=35 and <=39.9). Obesity-related health conditions include the following: hypertension. Obesity is improving with lifestyle modifications. BMI is is above average; BMI management plan is completed. We discussed low calorie, low carb based diet program, portion control and increasing exercise.    Reviewed the following with the patient: advised patient of need for:  colonoscopy and immunizations discussed and weight loss encouraged.  Counseled regarding: age-appropriate screening labs and tests, wearing seatbelt and sunscreen regularly  Discussed: regular exercise and diet changes to promote healthy weight, checking skin regularly for abnormal moles and lesions

## 2022-09-29 LAB
ALBUMIN SERPL-MCNC: 4.7 G/DL (ref 3.5–5.2)
ALBUMIN/GLOB SERPL: 2.5 G/DL
ALP SERPL-CCNC: 78 U/L (ref 39–117)
ALT SERPL W P-5'-P-CCNC: 28 U/L (ref 1–41)
ANION GAP SERPL CALCULATED.3IONS-SCNC: 10 MMOL/L (ref 5–15)
AST SERPL-CCNC: 18 U/L (ref 1–40)
BILIRUB SERPL-MCNC: 0.6 MG/DL (ref 0–1.2)
BUN SERPL-MCNC: 13 MG/DL (ref 6–20)
BUN/CREAT SERPL: 11.8 (ref 7–25)
CALCIUM SPEC-SCNC: 9.8 MG/DL (ref 8.6–10.5)
CHLORIDE SERPL-SCNC: 105 MMOL/L (ref 98–107)
CHOLEST SERPL-MCNC: 189 MG/DL (ref 0–200)
CO2 SERPL-SCNC: 24 MMOL/L (ref 22–29)
CREAT SERPL-MCNC: 1.1 MG/DL (ref 0.76–1.27)
EGFRCR SERPLBLD CKD-EPI 2021: 88.7 ML/MIN/1.73
GLOBULIN UR ELPH-MCNC: 1.9 GM/DL
GLUCOSE SERPL-MCNC: 82 MG/DL (ref 65–99)
HCV AB SER DONR QL: NORMAL
HDLC SERPL-MCNC: 39 MG/DL (ref 40–60)
LDLC SERPL CALC-MCNC: 136 MG/DL (ref 0–100)
LDLC/HDLC SERPL: 3.46 {RATIO}
POTASSIUM SERPL-SCNC: 4.3 MMOL/L (ref 3.5–5.2)
PROT SERPL-MCNC: 6.6 G/DL (ref 6–8.5)
SODIUM SERPL-SCNC: 139 MMOL/L (ref 136–145)
TRIGL SERPL-MCNC: 76 MG/DL (ref 0–150)
VLDLC SERPL-MCNC: 14 MG/DL (ref 5–40)

## 2023-08-18 DIAGNOSIS — I10 ESSENTIAL HYPERTENSION: ICD-10-CM

## 2023-08-18 RX ORDER — IRBESARTAN AND HYDROCHLOROTHIAZIDE 150; 12.5 MG/1; MG/1
1 TABLET, FILM COATED ORAL DAILY
Qty: 90 TABLET | Refills: 0 | Status: SHIPPED | OUTPATIENT
Start: 2023-08-18

## 2023-09-21 NOTE — TELEPHONE ENCOUNTER
Pt medication needs prior authorization. I tried to submit once and it came back N/A as if it didn't need one. Will resubmit now. LMOVM advising pt of this.    Consent 3/Introductory Paragraph: I gave the patient a chance to ask questions they had about the procedure.  Following this I explained the Mohs procedure and consent was obtained. The risks, benefits and alternatives to therapy were discussed in detail. Specifically, the risks of infection, scarring, bleeding, prolonged wound healing, incomplete removal, allergy to anesthesia, nerve injury and recurrence were addressed. Prior to the procedure, the treatment site was clearly identified and confirmed by the patient. All components of Universal Protocol/PAUSE Rule completed.

## 2023-10-03 DIAGNOSIS — F41.8 DEPRESSION WITH ANXIETY: ICD-10-CM

## 2023-10-04 RX ORDER — BUPROPION HYDROCHLORIDE 150 MG/1
150 TABLET ORAL DAILY
Qty: 30 TABLET | Refills: 0 | Status: SHIPPED | OUTPATIENT
Start: 2023-10-04

## 2023-10-12 ENCOUNTER — LAB (OUTPATIENT)
Dept: INTERNAL MEDICINE | Facility: CLINIC | Age: 38
End: 2023-10-12
Payer: COMMERCIAL

## 2023-10-12 ENCOUNTER — OFFICE VISIT (OUTPATIENT)
Dept: INTERNAL MEDICINE | Facility: CLINIC | Age: 38
End: 2023-10-12
Payer: COMMERCIAL

## 2023-10-12 VITALS
SYSTOLIC BLOOD PRESSURE: 122 MMHG | OXYGEN SATURATION: 97 % | HEIGHT: 66 IN | WEIGHT: 211 LBS | HEART RATE: 86 BPM | DIASTOLIC BLOOD PRESSURE: 80 MMHG | BODY MASS INDEX: 33.91 KG/M2

## 2023-10-12 DIAGNOSIS — Z00.00 HEALTH CARE MAINTENANCE: Primary | ICD-10-CM

## 2023-10-12 DIAGNOSIS — I10 ESSENTIAL HYPERTENSION: Primary | ICD-10-CM

## 2023-10-12 DIAGNOSIS — F41.8 DEPRESSION WITH ANXIETY: ICD-10-CM

## 2023-10-12 DIAGNOSIS — M25.562 ACUTE PAIN OF LEFT KNEE: ICD-10-CM

## 2023-10-12 DIAGNOSIS — I10 ESSENTIAL HYPERTENSION: ICD-10-CM

## 2023-10-12 LAB
ALBUMIN SERPL-MCNC: 4.7 G/DL (ref 3.5–5.2)
ALBUMIN/GLOB SERPL: 2 G/DL
ALP SERPL-CCNC: 78 U/L (ref 39–117)
ALT SERPL W P-5'-P-CCNC: 21 U/L (ref 1–41)
ANION GAP SERPL CALCULATED.3IONS-SCNC: 10.2 MMOL/L (ref 5–15)
AST SERPL-CCNC: 18 U/L (ref 1–40)
BILIRUB SERPL-MCNC: 0.8 MG/DL (ref 0–1.2)
BUN SERPL-MCNC: 14 MG/DL (ref 6–20)
BUN/CREAT SERPL: 11.6 (ref 7–25)
CALCIUM SPEC-SCNC: 10 MG/DL (ref 8.6–10.5)
CHLORIDE SERPL-SCNC: 105 MMOL/L (ref 98–107)
CHOLEST SERPL-MCNC: 176 MG/DL (ref 0–200)
CO2 SERPL-SCNC: 23.8 MMOL/L (ref 22–29)
CREAT SERPL-MCNC: 1.21 MG/DL (ref 0.76–1.27)
DEPRECATED RDW RBC AUTO: 41.6 FL (ref 37–54)
EGFRCR SERPLBLD CKD-EPI 2021: 78.6 ML/MIN/1.73
ERYTHROCYTE [DISTWIDTH] IN BLOOD BY AUTOMATED COUNT: 12.7 % (ref 12.3–15.4)
GLOBULIN UR ELPH-MCNC: 2.3 GM/DL
GLUCOSE SERPL-MCNC: 88 MG/DL (ref 65–99)
HCT VFR BLD AUTO: 46.1 % (ref 37.5–51)
HDLC SERPL-MCNC: 52 MG/DL (ref 40–60)
HGB BLD-MCNC: 16.4 G/DL (ref 13–17.7)
LDLC SERPL CALC-MCNC: 114 MG/DL (ref 0–100)
LDLC/HDLC SERPL: 2.19 {RATIO}
MCH RBC QN AUTO: 31.9 PG (ref 26.6–33)
MCHC RBC AUTO-ENTMCNC: 35.6 G/DL (ref 31.5–35.7)
MCV RBC AUTO: 89.7 FL (ref 79–97)
PLATELET # BLD AUTO: 280 10*3/MM3 (ref 140–450)
PMV BLD AUTO: 10.1 FL (ref 6–12)
POTASSIUM SERPL-SCNC: 4.4 MMOL/L (ref 3.5–5.2)
PROT SERPL-MCNC: 7 G/DL (ref 6–8.5)
RBC # BLD AUTO: 5.14 10*6/MM3 (ref 4.14–5.8)
SODIUM SERPL-SCNC: 139 MMOL/L (ref 136–145)
TRIGL SERPL-MCNC: 51 MG/DL (ref 0–150)
VLDLC SERPL-MCNC: 10 MG/DL (ref 5–40)
WBC NRBC COR # BLD: 6.29 10*3/MM3 (ref 3.4–10.8)

## 2023-10-12 PROCEDURE — 36415 COLL VENOUS BLD VENIPUNCTURE: CPT | Performed by: INTERNAL MEDICINE

## 2023-10-12 PROCEDURE — 85027 COMPLETE CBC AUTOMATED: CPT | Performed by: INTERNAL MEDICINE

## 2023-10-12 PROCEDURE — 99395 PREV VISIT EST AGE 18-39: CPT | Performed by: INTERNAL MEDICINE

## 2023-10-12 PROCEDURE — 80053 COMPREHEN METABOLIC PANEL: CPT | Performed by: INTERNAL MEDICINE

## 2023-10-12 PROCEDURE — 80061 LIPID PANEL: CPT | Performed by: INTERNAL MEDICINE

## 2023-10-12 RX ORDER — IRBESARTAN AND HYDROCHLOROTHIAZIDE 150; 12.5 MG/1; MG/1
1 TABLET, FILM COATED ORAL DAILY
Qty: 90 TABLET | Refills: 3 | Status: SHIPPED | OUTPATIENT
Start: 2023-10-12

## 2023-10-12 RX ORDER — BUPROPION HYDROCHLORIDE 300 MG/1
300 TABLET ORAL DAILY
Qty: 90 TABLET | Refills: 3 | Status: SHIPPED | OUTPATIENT
Start: 2023-10-12

## 2023-10-12 NOTE — PROGRESS NOTES
"Subjective   Lexa Conn is a 38 y.o. male here for yearly physical.  He complains of left knee pain that occurred a few months ago.  He says he was walking and running regularly, around a mile 4-1/2 he would start to get pain on either side of the patella.  He eventually had to stop running due to this.  He tried to find a brace, but it kept rolling down.  He denies any swelling or issues with the knee since he quit running.    Past Medical History:   Diagnosis Date    ADHD (attention deficit hyperactivity disorder)     Arthritis     Depression     Hypertension      Family History   Problem Relation Age of Onset    Arthritis Mother     Hyperlipidemia Mother     Cancer Father         Father passed away    Hyperlipidemia Father     Arthritis Other     Hyperlipidemia Other     Hypertension Other      History reviewed. No pertinent surgical history.  Social History     Socioeconomic History    Marital status:    Tobacco Use    Smoking status: Never     Passive exposure: Never    Smokeless tobacco: Never   Vaping Use    Vaping Use: Never used   Substance and Sexual Activity    Alcohol use: Yes     Comment: 0.5 drinks daily    Drug use: Never    Sexual activity: Yes     Partners: Female     Birth control/protection: Condom, None         Current Outpatient Medications:     buPROPion XL (WELLBUTRIN XL) 150 MG 24 hr tablet, TAKE 1 TABLET BY MOUTH DAILY, Disp: 30 tablet, Rfl: 0    irbesartan-hydrochlorothiazide (AVALIDE) 150-12.5 MG tablet, TAKE 1 TABLET BY MOUTH DAILY, Disp: 90 tablet, Rfl: 0    Objective   /80 (BP Location: Left arm)   Pulse 86   Ht 167 cm (65.75\")   Wt 95.7 kg (211 lb)   SpO2 97%   BMI 34.32 kg/mý   Physical Exam  Vitals reviewed.   Constitutional:       General: He is not in acute distress.     Appearance: Normal appearance. He is well-developed.   HENT:      Head: Normocephalic and atraumatic.      Right Ear: Tympanic membrane, ear canal and external ear normal.      Left Ear: " Tympanic membrane, ear canal and external ear normal.      Nose: Nose normal.      Mouth/Throat:      Lips: Pink.      Mouth: Mucous membranes are moist.      Tongue: No lesions.      Pharynx: Oropharynx is clear.   Eyes:      General: Lids are normal. Vision grossly intact. No scleral icterus.     Conjunctiva/sclera: Conjunctivae normal.      Pupils: Pupils are equal, round, and reactive to light.   Neck:      Thyroid: No thyroid mass, thyromegaly or thyroid tenderness.      Vascular: No carotid bruit.   Cardiovascular:      Rate and Rhythm: Normal rate and regular rhythm.      Heart sounds: Normal heart sounds. No murmur heard.     No friction rub. No gallop. No S3 or S4 sounds.   Pulmonary:      Effort: Pulmonary effort is normal.      Breath sounds: Normal breath sounds. No wheezing, rhonchi or rales.   Abdominal:      General: Bowel sounds are normal. There is no distension.      Palpations: Abdomen is soft. There is no mass.      Tenderness: There is no abdominal tenderness.   Musculoskeletal:         General: Normal range of motion.      Cervical back: Neck supple.      Right lower leg: No edema.      Left lower leg: No edema.   Lymphadenopathy:      Cervical: No cervical adenopathy.   Skin:     General: Skin is warm and dry.      Coloration: Skin is not pale.      Findings: No erythema or rash.   Neurological:      Mental Status: He is alert and oriented to person, place, and time. Mental status is at baseline.      Cranial Nerves: No cranial nerve deficit.      Sensory: No sensory deficit.      Gait: Gait is intact.      Comments: CNs grossly intact   Psychiatric:         Attention and Perception: Attention normal.         Mood and Affect: Mood normal.         Speech: Speech normal.         Behavior: Behavior normal.         Thought Content: Thought content normal.         Judgment: Judgment normal.         Assessment & Plan   Diagnoses and all orders for this visit:    1. Health care maintenance  (Primary)  -     CBC (No Diff)  -     Comprehensive Metabolic Panel  -     Lipid Panel    2. Depression with anxiety  -     buPROPion XL (Wellbutrin XL) 300 MG 24 hr tablet; Take 1 tablet by mouth Daily.  Dispense: 90 tablet; Refill: 3  -increase to 300mg for better sx control    3. Essential hypertension  -     irbesartan-hydrochlorothiazide (AVALIDE) 150-12.5 MG tablet; Take 1 tablet by mouth Daily.  Dispense: 90 tablet; Refill: 3    4. Acute pain of left knee  -discussed PT, bracing, sports med referral      1. Health Care Maintenance  -cscope recommended due to sister's hx of precancerous polyp at a young age, pt still contemplating. Advised him I definitely rec he do the screening as colon ca is very prevalent these days  -fasting labs  -defers flu, covid vax  -BMI is >= 30 and <35. (Class 1 Obesity). The following options were offered after discussion;: exercise counseling/recommendations    Reviewed the following with the patient: advised patient of need for:  colonoscopy and immunizations discussed and encouraged patient to exercise 5-7 days per week for 30 minutes at a time.  Counseled regarding: age-appropriate screening labs and tests, wearing seatbelt and sunscreen regularly  Discussed: regular exercise and diet changes to promote healthy weight, checking skin regularly for abnormal moles and lesions

## 2024-10-24 ENCOUNTER — OFFICE VISIT (OUTPATIENT)
Dept: INTERNAL MEDICINE | Facility: CLINIC | Age: 39
End: 2024-10-24
Payer: COMMERCIAL

## 2024-10-24 ENCOUNTER — LAB (OUTPATIENT)
Dept: INTERNAL MEDICINE | Facility: CLINIC | Age: 39
End: 2024-10-24
Payer: COMMERCIAL

## 2024-10-24 VITALS
DIASTOLIC BLOOD PRESSURE: 84 MMHG | HEART RATE: 92 BPM | BODY MASS INDEX: 37.12 KG/M2 | WEIGHT: 231 LBS | SYSTOLIC BLOOD PRESSURE: 132 MMHG | OXYGEN SATURATION: 98 % | HEIGHT: 66 IN

## 2024-10-24 DIAGNOSIS — Z00.00 HEALTH CARE MAINTENANCE: Primary | ICD-10-CM

## 2024-10-24 DIAGNOSIS — E66.01 CLASS 2 SEVERE OBESITY DUE TO EXCESS CALORIES WITH SERIOUS COMORBIDITY AND BODY MASS INDEX (BMI) OF 37.0 TO 37.9 IN ADULT: ICD-10-CM

## 2024-10-24 DIAGNOSIS — E66.812 CLASS 2 SEVERE OBESITY DUE TO EXCESS CALORIES WITH SERIOUS COMORBIDITY AND BODY MASS INDEX (BMI) OF 37.0 TO 37.9 IN ADULT: ICD-10-CM

## 2024-10-24 PROCEDURE — 85027 COMPLETE CBC AUTOMATED: CPT | Performed by: INTERNAL MEDICINE

## 2024-10-24 PROCEDURE — 36415 COLL VENOUS BLD VENIPUNCTURE: CPT | Performed by: INTERNAL MEDICINE

## 2024-10-24 PROCEDURE — 80053 COMPREHEN METABOLIC PANEL: CPT | Performed by: INTERNAL MEDICINE

## 2024-10-24 PROCEDURE — 99395 PREV VISIT EST AGE 18-39: CPT | Performed by: INTERNAL MEDICINE

## 2024-10-24 PROCEDURE — 80061 LIPID PANEL: CPT | Performed by: INTERNAL MEDICINE

## 2024-10-24 RX ORDER — SEMAGLUTIDE 0.25 MG/.5ML
0.25 INJECTION, SOLUTION SUBCUTANEOUS WEEKLY
Qty: 2 ML | Refills: 0 | Status: SHIPPED | OUTPATIENT
Start: 2024-10-24

## 2024-10-24 NOTE — PROGRESS NOTES
"Subjective   Lexa Conn is a 39 y.o. male here for yearly physical.  Interested in the weight loss injection.    Past Medical History:   Diagnosis Date    ADHD (attention deficit hyperactivity disorder)     Arthritis     Depression     Hypertension      Family History   Problem Relation Age of Onset    Arthritis Mother     Hyperlipidemia Mother     Cancer Father         Father passed away    Hyperlipidemia Father     Arthritis Other     Hyperlipidemia Other     Hypertension Other      Past Surgical History:   Procedure Laterality Date    COLONOSCOPY  9/20     Social History     Socioeconomic History    Marital status:    Tobacco Use    Smoking status: Never     Passive exposure: Never    Smokeless tobacco: Never   Vaping Use    Vaping status: Never Used   Substance and Sexual Activity    Alcohol use: Yes     Comment: 0.5 drinks daily    Drug use: Never    Sexual activity: Yes     Partners: Female     Birth control/protection: Condom, None         Current Outpatient Medications:     buPROPion XL (Wellbutrin XL) 300 MG 24 hr tablet, Take 1 tablet by mouth Daily., Disp: 90 tablet, Rfl: 3    irbesartan-hydrochlorothiazide (AVALIDE) 150-12.5 MG tablet, Take 1 tablet by mouth Daily., Disp: 90 tablet, Rfl: 3    Objective   /84 (BP Location: Left arm)   Pulse 92   Ht 167 cm (65.75\")   Wt 105 kg (231 lb)   SpO2 98%   BMI 37.57 kg/m²   Physical Exam  Vitals reviewed.   Constitutional:       General: He is not in acute distress.     Appearance: Normal appearance. He is well-developed.   HENT:      Head: Normocephalic and atraumatic.      Right Ear: Tympanic membrane, ear canal and external ear normal.      Left Ear: Tympanic membrane, ear canal and external ear normal.      Nose: Nose normal.      Mouth/Throat:      Lips: Pink.      Mouth: Mucous membranes are moist.      Tongue: No lesions.      Pharynx: Oropharynx is clear.   Eyes:      General: Lids are normal. Vision grossly intact. No scleral " icterus.     Conjunctiva/sclera: Conjunctivae normal.      Pupils: Pupils are equal, round, and reactive to light.   Neck:      Thyroid: No thyroid mass, thyromegaly or thyroid tenderness.      Vascular: No carotid bruit.   Cardiovascular:      Rate and Rhythm: Normal rate and regular rhythm.      Heart sounds: Normal heart sounds. No murmur heard.     No friction rub. No gallop. No S3 or S4 sounds.   Pulmonary:      Effort: Pulmonary effort is normal.      Breath sounds: Normal breath sounds. No wheezing, rhonchi or rales.   Abdominal:      General: Bowel sounds are normal. There is no distension.      Palpations: Abdomen is soft. There is no mass.      Tenderness: There is no abdominal tenderness.   Musculoskeletal:         General: Normal range of motion.      Cervical back: Neck supple.      Right lower leg: No edema.      Left lower leg: No edema.   Lymphadenopathy:      Cervical: No cervical adenopathy.   Skin:     General: Skin is warm and dry.      Coloration: Skin is not pale.      Findings: No erythema or rash.   Neurological:      Mental Status: He is alert and oriented to person, place, and time. Mental status is at baseline.      Cranial Nerves: No cranial nerve deficit.      Sensory: No sensory deficit.      Gait: Gait is intact.      Comments: CNs grossly intact. Balance grossly intact.   Psychiatric:         Attention and Perception: Attention normal.         Mood and Affect: Mood normal.         Speech: Speech normal.         Behavior: Behavior normal.         Thought Content: Thought content normal.         Judgment: Judgment normal.         Assessment & Plan   Diagnoses and all orders for this visit:    1. Health care maintenance (Primary)  -     CBC (No Diff)  -     Comprehensive Metabolic Panel  -     Lipid Panel    2. Class 2 severe obesity due to excess calories with serious comorbidity and body mass index (BMI) of 37.0 to 37.9 in adult  -     Semaglutide-Weight Management (Wegovy) 0.25  MG/0.5ML solution auto-injector; Inject 0.5 mL under the skin into the appropriate area as directed 1 (One) Time Per Week.  Dispense: 2 mL; Refill: 0  -New Rx for compounded semaglutide.  Discussed common side effects, expectations, dosing        1. Health Care Maintenance  -colon cancer screening UTD, every 3 years  -fasting labs  -Class 2 Severe Obesity (BMI >=35 and <=39.9). Obesity-related health conditions include the following: none. Obesity is worsening. BMI is is above average; BMI management plan is completed. We discussed portion control, increasing exercise, and pharmacologic options including semaglutide .    Reviewed the following with the patient: advised patient of need for:  immunizations discussed and weight loss encouraged.  Counseled regarding: age-appropriate screening labs and tests, wearing seatbelt and sunscreen regularly  Discussed: regular exercise and diet changes to promote healthy weight, checking skin regularly for abnormal moles and lesions

## 2024-10-25 LAB
ALBUMIN SERPL-MCNC: 4.8 G/DL (ref 3.5–5.2)
ALBUMIN/GLOB SERPL: 2.4 G/DL
ALP SERPL-CCNC: 93 U/L (ref 39–117)
ALT SERPL W P-5'-P-CCNC: 50 U/L (ref 1–41)
ANION GAP SERPL CALCULATED.3IONS-SCNC: 13 MMOL/L (ref 5–15)
AST SERPL-CCNC: 32 U/L (ref 1–40)
BILIRUB SERPL-MCNC: 0.7 MG/DL (ref 0–1.2)
BUN SERPL-MCNC: 14 MG/DL (ref 6–20)
BUN/CREAT SERPL: 12 (ref 7–25)
CALCIUM SPEC-SCNC: 9.7 MG/DL (ref 8.6–10.5)
CHLORIDE SERPL-SCNC: 102 MMOL/L (ref 98–107)
CHOLEST SERPL-MCNC: 214 MG/DL (ref 0–200)
CO2 SERPL-SCNC: 22 MMOL/L (ref 22–29)
CREAT SERPL-MCNC: 1.17 MG/DL (ref 0.76–1.27)
DEPRECATED RDW RBC AUTO: 42.3 FL (ref 37–54)
EGFRCR SERPLBLD CKD-EPI 2021: 81.3 ML/MIN/1.73
ERYTHROCYTE [DISTWIDTH] IN BLOOD BY AUTOMATED COUNT: 13 % (ref 12.3–15.4)
GLOBULIN UR ELPH-MCNC: 2 GM/DL
GLUCOSE SERPL-MCNC: 82 MG/DL (ref 65–99)
HCT VFR BLD AUTO: 46.4 % (ref 37.5–51)
HDLC SERPL-MCNC: 45 MG/DL (ref 40–60)
HGB BLD-MCNC: 16.2 G/DL (ref 13–17.7)
LDLC SERPL CALC-MCNC: 144 MG/DL (ref 0–100)
LDLC/HDLC SERPL: 3.14 {RATIO}
MCH RBC QN AUTO: 31.3 PG (ref 26.6–33)
MCHC RBC AUTO-ENTMCNC: 34.9 G/DL (ref 31.5–35.7)
MCV RBC AUTO: 89.7 FL (ref 79–97)
PLATELET # BLD AUTO: 250 10*3/MM3 (ref 140–450)
PMV BLD AUTO: 10 FL (ref 6–12)
POTASSIUM SERPL-SCNC: 4 MMOL/L (ref 3.5–5.2)
PROT SERPL-MCNC: 6.8 G/DL (ref 6–8.5)
RBC # BLD AUTO: 5.17 10*6/MM3 (ref 4.14–5.8)
SODIUM SERPL-SCNC: 137 MMOL/L (ref 136–145)
TRIGL SERPL-MCNC: 138 MG/DL (ref 0–150)
VLDLC SERPL-MCNC: 25 MG/DL (ref 5–40)
WBC NRBC COR # BLD AUTO: 5.17 10*3/MM3 (ref 3.4–10.8)

## 2024-11-02 DIAGNOSIS — I10 ESSENTIAL HYPERTENSION: ICD-10-CM

## 2024-11-02 DIAGNOSIS — F41.8 DEPRESSION WITH ANXIETY: ICD-10-CM

## 2024-11-05 RX ORDER — BUPROPION HYDROCHLORIDE 300 MG/1
300 TABLET ORAL DAILY
Qty: 90 TABLET | Refills: 3 | Status: SHIPPED | OUTPATIENT
Start: 2024-11-05

## 2024-11-05 RX ORDER — IRBESARTAN AND HYDROCHLOROTHIAZIDE 150; 12.5 MG/1; MG/1
1 TABLET, FILM COATED ORAL DAILY
Qty: 90 TABLET | Refills: 3 | Status: SHIPPED | OUTPATIENT
Start: 2024-11-05

## 2024-11-27 DIAGNOSIS — E66.01 CLASS 2 SEVERE OBESITY DUE TO EXCESS CALORIES WITH SERIOUS COMORBIDITY AND BODY MASS INDEX (BMI) OF 37.0 TO 37.9 IN ADULT: ICD-10-CM

## 2024-11-27 DIAGNOSIS — E66.812 CLASS 2 SEVERE OBESITY DUE TO EXCESS CALORIES WITH SERIOUS COMORBIDITY AND BODY MASS INDEX (BMI) OF 37.0 TO 37.9 IN ADULT: ICD-10-CM

## 2024-11-27 RX ORDER — SEMAGLUTIDE 0.25 MG/.5ML
0.25 INJECTION, SOLUTION SUBCUTANEOUS WEEKLY
Qty: 2 ML | Refills: 0 | Status: CANCELLED | OUTPATIENT
Start: 2024-11-27

## 2024-12-02 RX ORDER — SEMAGLUTIDE 0.5 MG/.5ML
0.5 INJECTION, SOLUTION SUBCUTANEOUS WEEKLY
Qty: 2 ML | Refills: 0 | Status: SHIPPED | OUTPATIENT
Start: 2024-12-02

## 2024-12-23 RX ORDER — SEMAGLUTIDE 0.5 MG/.5ML
0.5 INJECTION, SOLUTION SUBCUTANEOUS WEEKLY
Qty: 2 ML | Refills: 0 | Status: SHIPPED | OUTPATIENT
Start: 2024-12-23

## 2025-01-13 RX ORDER — SEMAGLUTIDE 0.5 MG/.5ML
0.5 INJECTION, SOLUTION SUBCUTANEOUS WEEKLY
Qty: 2 ML | Refills: 0 | Status: SHIPPED | OUTPATIENT
Start: 2025-01-13